# Patient Record
Sex: FEMALE | Race: BLACK OR AFRICAN AMERICAN | NOT HISPANIC OR LATINO | Employment: FULL TIME | URBAN - METROPOLITAN AREA
[De-identification: names, ages, dates, MRNs, and addresses within clinical notes are randomized per-mention and may not be internally consistent; named-entity substitution may affect disease eponyms.]

---

## 2018-06-26 ENCOUNTER — HOSPITAL ENCOUNTER (INPATIENT)
Facility: HOSPITAL | Age: 23
LOS: 5 days | Discharge: HOME | End: 2018-07-01
Attending: OBSTETRICS & GYNECOLOGY | Admitting: OBSTETRICS & GYNECOLOGY
Payer: MEDICAID

## 2018-06-26 ENCOUNTER — ANESTHESIA (OUTPATIENT)
Dept: OBSTETRICS AND GYNECOLOGY | Facility: HOSPITAL | Age: 23
End: 2018-06-26
Payer: MEDICAID

## 2018-06-26 ENCOUNTER — ANESTHESIA EVENT (OUTPATIENT)
Dept: OBSTETRICS AND GYNECOLOGY | Facility: HOSPITAL | Age: 23
End: 2018-06-26
Payer: MEDICAID

## 2018-06-26 DIAGNOSIS — Z34.80 SUPERVISION OF OTHER NORMAL PREGNANCY, ANTEPARTUM: ICD-10-CM

## 2018-06-26 DIAGNOSIS — D56.3 ALPHA THALASSEMIA TRAIT: ICD-10-CM

## 2018-06-26 DIAGNOSIS — Z34.03 ENCOUNTER FOR SUPERVISION OF NORMAL FIRST PREGNANCY IN THIRD TRIMESTER: ICD-10-CM

## 2018-06-26 LAB
ABO + RH BLD: NORMAL
ALBUMIN SERPL BCP-MCNC: 2.6 G/DL
ALP SERPL-CCNC: 182 U/L
ALT SERPL W/O P-5'-P-CCNC: 9 U/L
ANION GAP SERPL CALC-SCNC: 10 MMOL/L
AST SERPL-CCNC: 13 U/L
BASOPHILS # BLD AUTO: 0.01 K/UL
BASOPHILS NFR BLD: 0.1 %
BILIRUB SERPL-MCNC: 0.4 MG/DL
BLD GP AB SCN CELLS X3 SERPL QL: NORMAL
BUN SERPL-MCNC: 3 MG/DL
CALCIUM SERPL-MCNC: 9.2 MG/DL
CHLORIDE SERPL-SCNC: 110 MMOL/L
CO2 SERPL-SCNC: 20 MMOL/L
CREAT SERPL-MCNC: 0.7 MG/DL
CREAT UR-MCNC: 31.6 MG/DL
CREAT UR-MCNC: 47.4 MG/DL
DIFFERENTIAL METHOD: ABNORMAL
EOSINOPHIL # BLD AUTO: 0.1 K/UL
EOSINOPHIL NFR BLD: 0.7 %
ERYTHROCYTE [DISTWIDTH] IN BLOOD BY AUTOMATED COUNT: 15.8 %
EST. GFR  (AFRICAN AMERICAN): >60 ML/MIN/1.73 M^2
EST. GFR  (NON AFRICAN AMERICAN): >60 ML/MIN/1.73 M^2
GLUCOSE SERPL-MCNC: 68 MG/DL
HCT VFR BLD AUTO: 27.2 %
HGB BLD-MCNC: 8.5 G/DL
LDH SERPL L TO P-CCNC: 187 U/L
LYMPHOCYTES # BLD AUTO: 1.3 K/UL
LYMPHOCYTES NFR BLD: 17.5 %
MCH RBC QN AUTO: 22 PG
MCHC RBC AUTO-ENTMCNC: 31.3 G/DL
MCV RBC AUTO: 71 FL
MONOCYTES # BLD AUTO: 0.7 K/UL
MONOCYTES NFR BLD: 8.7 %
NEUTROPHILS # BLD AUTO: 5.6 K/UL
NEUTROPHILS NFR BLD: 72.7 %
PLATELET # BLD AUTO: 334 K/UL
PMV BLD AUTO: 10.9 FL
POTASSIUM SERPL-SCNC: 3.7 MMOL/L
PROT SERPL-MCNC: 6.5 G/DL
PROT UR-MCNC: 31 MG/DL
PROT UR-MCNC: 79 MG/DL
PROT/CREAT RATIO, UR: 0.98
PROT/CREAT RATIO, UR: 1.67
RBC # BLD AUTO: 3.86 M/UL
SODIUM SERPL-SCNC: 140 MMOL/L
URATE SERPL-MCNC: 5.5 MG/DL
WBC # BLD AUTO: 7.66 K/UL

## 2018-06-26 PROCEDURE — 84156 ASSAY OF PROTEIN URINE: CPT | Mod: 91

## 2018-06-26 PROCEDURE — 83615 LACTATE (LD) (LDH) ENZYME: CPT

## 2018-06-26 PROCEDURE — 63600175 PHARM REV CODE 636 W HCPCS: Performed by: OBSTETRICS & GYNECOLOGY

## 2018-06-26 PROCEDURE — 3E033VJ INTRODUCTION OF OTHER HORMONE INTO PERIPHERAL VEIN, PERCUTANEOUS APPROACH: ICD-10-PCS | Performed by: OBSTETRICS & GYNECOLOGY

## 2018-06-26 PROCEDURE — 86850 RBC ANTIBODY SCREEN: CPT

## 2018-06-26 PROCEDURE — 59514 CESAREAN DELIVERY ONLY: CPT | Mod: AA,,, | Performed by: ANESTHESIOLOGY

## 2018-06-26 PROCEDURE — 86592 SYPHILIS TEST NON-TREP QUAL: CPT

## 2018-06-26 PROCEDURE — 62326 NJX INTERLAMINAR LMBR/SAC: CPT | Performed by: ANESTHESIOLOGY

## 2018-06-26 PROCEDURE — 25000003 PHARM REV CODE 250: Performed by: OBSTETRICS & GYNECOLOGY

## 2018-06-26 PROCEDURE — 27200710 HC EPIDURAL INFUSION PUMP SET: Performed by: ANESTHESIOLOGY

## 2018-06-26 PROCEDURE — 25000003 PHARM REV CODE 250: Performed by: ANESTHESIOLOGY

## 2018-06-26 PROCEDURE — 84550 ASSAY OF BLOOD/URIC ACID: CPT

## 2018-06-26 PROCEDURE — 10907ZC DRAINAGE OF AMNIOTIC FLUID, THERAPEUTIC FROM PRODUCTS OF CONCEPTION, VIA NATURAL OR ARTIFICIAL OPENING: ICD-10-PCS | Performed by: OBSTETRICS & GYNECOLOGY

## 2018-06-26 PROCEDURE — 80053 COMPREHEN METABOLIC PANEL: CPT

## 2018-06-26 PROCEDURE — 82570 ASSAY OF URINE CREATININE: CPT

## 2018-06-26 PROCEDURE — 27800517 HC TRAY,EPIDURAL-CONTINUOUS: Performed by: ANESTHESIOLOGY

## 2018-06-26 PROCEDURE — 36415 COLL VENOUS BLD VENIPUNCTURE: CPT

## 2018-06-26 PROCEDURE — 11000001 HC ACUTE MED/SURG PRIVATE ROOM

## 2018-06-26 PROCEDURE — 85025 COMPLETE CBC W/AUTO DIFF WBC: CPT

## 2018-06-26 PROCEDURE — 63600175 PHARM REV CODE 636 W HCPCS: Performed by: ANESTHESIOLOGY

## 2018-06-26 RX ORDER — FENTANYL/BUPIVACAINE/NS/PF 2MCG/ML-.1
PLASTIC BAG, INJECTION (ML) INJECTION CONTINUOUS PRN
Status: DISCONTINUED | OUTPATIENT
Start: 2018-06-26 | End: 2018-06-27

## 2018-06-26 RX ORDER — SODIUM CHLORIDE, SODIUM LACTATE, POTASSIUM CHLORIDE, CALCIUM CHLORIDE 600; 310; 30; 20 MG/100ML; MG/100ML; MG/100ML; MG/100ML
INJECTION, SOLUTION INTRAVENOUS CONTINUOUS
Status: DISCONTINUED | OUTPATIENT
Start: 2018-06-26 | End: 2018-06-27

## 2018-06-26 RX ORDER — ACETAMINOPHEN 325 MG/1
650 TABLET ORAL ONCE AS NEEDED
Status: ACTIVE | OUTPATIENT
Start: 2018-06-26 | End: 2018-06-26

## 2018-06-26 RX ORDER — BUPIVACAINE HYDROCHLORIDE 2.5 MG/ML
INJECTION, SOLUTION EPIDURAL; INFILTRATION; INTRACAUDAL
Status: DISCONTINUED | OUTPATIENT
Start: 2018-06-26 | End: 2018-06-27

## 2018-06-26 RX ORDER — OXYTOCIN/RINGER'S LACTATE 20/1000 ML
2 PLASTIC BAG, INJECTION (ML) INTRAVENOUS CONTINUOUS
Status: DISCONTINUED | OUTPATIENT
Start: 2018-06-26 | End: 2018-06-27

## 2018-06-26 RX ORDER — MAGNESIUM SULFATE HEPTAHYDRATE 40 MG/ML
4 INJECTION, SOLUTION INTRAVENOUS ONCE
Status: COMPLETED | OUTPATIENT
Start: 2018-06-26 | End: 2018-06-27

## 2018-06-26 RX ORDER — HYDRALAZINE HYDROCHLORIDE 20 MG/ML
10 INJECTION INTRAMUSCULAR; INTRAVENOUS EVERY 30 MIN PRN
Status: DISCONTINUED | OUTPATIENT
Start: 2018-06-26 | End: 2018-06-27

## 2018-06-26 RX ORDER — MAGNESIUM SULFATE HEPTAHYDRATE 40 MG/ML
2 INJECTION, SOLUTION INTRAVENOUS CONTINUOUS
Status: DISCONTINUED | OUTPATIENT
Start: 2018-06-27 | End: 2018-06-27

## 2018-06-26 RX ORDER — SODIUM CHLORIDE, SODIUM LACTATE, POTASSIUM CHLORIDE, CALCIUM CHLORIDE 600; 310; 30; 20 MG/100ML; MG/100ML; MG/100ML; MG/100ML
1000 INJECTION, SOLUTION INTRAVENOUS CONTINUOUS
Status: DISCONTINUED | OUTPATIENT
Start: 2018-06-27 | End: 2018-06-27

## 2018-06-26 RX ORDER — FENTANYL CITRATE 50 UG/ML
INJECTION, SOLUTION INTRAMUSCULAR; INTRAVENOUS
Status: DISCONTINUED | OUTPATIENT
Start: 2018-06-26 | End: 2018-06-27

## 2018-06-26 RX ORDER — CALCIUM GLUCONATE 98 MG/ML
1 INJECTION, SOLUTION INTRAVENOUS
Status: DISCONTINUED | OUTPATIENT
Start: 2018-06-27 | End: 2018-06-27

## 2018-06-26 RX ADMIN — SODIUM CHLORIDE, SODIUM LACTATE, POTASSIUM CHLORIDE, AND CALCIUM CHLORIDE: .6; .31; .03; .02 INJECTION, SOLUTION INTRAVENOUS at 11:06

## 2018-06-26 RX ADMIN — Medication 2 MILLI-UNITS/MIN: at 12:06

## 2018-06-26 RX ADMIN — SODIUM CHLORIDE, SODIUM LACTATE, POTASSIUM CHLORIDE, AND CALCIUM CHLORIDE 1000 ML: .6; .31; .03; .02 INJECTION, SOLUTION INTRAVENOUS at 09:06

## 2018-06-26 RX ADMIN — BUPIVACAINE HYDROCHLORIDE 5 ML: 2.5 INJECTION, SOLUTION EPIDURAL; INFILTRATION; INTRACAUDAL; PERINEURAL at 10:06

## 2018-06-26 RX ADMIN — FENTANYL CITRATE 100 MCG: 50 INJECTION INTRAMUSCULAR; INTRAVENOUS at 10:06

## 2018-06-26 RX ADMIN — Medication 10 ML/HR: at 10:06

## 2018-06-26 RX ADMIN — SODIUM CHLORIDE, SODIUM LACTATE, POTASSIUM CHLORIDE, AND CALCIUM CHLORIDE: .6; .31; .03; .02 INJECTION, SOLUTION INTRAVENOUS at 01:06

## 2018-06-26 RX ADMIN — MAGNESIUM SULFATE HEPTAHYDRATE 4 G: 40 INJECTION, SOLUTION INTRAVENOUS at 11:06

## 2018-06-26 NOTE — PROGRESS NOTES
To room, efm adjusted, toco adjusted... Baby very active difficult to race. Fetal heart rate.... Patient verb understanding to remain in one position in order to obtain tracing. Patient still anxious.....

## 2018-06-26 NOTE — PROGRESS NOTES
To room, patient very very anxious, enc to relax, will monitor bp closely.... Lights turned off, mother at bedside.

## 2018-06-26 NOTE — PROGRESS NOTES
Spoke with MD, labs drawn, all questions answered.... Iv started, bolus in progess, pitocin started.

## 2018-06-26 NOTE — PROGRESS NOTES
Pt with bp elevation . Seems to be very uncomfortable on pitocin but does not want any meds or epidural. Will do preeclamsia bloodwork

## 2018-06-27 ENCOUNTER — SURGERY (OUTPATIENT)
Age: 23
End: 2018-06-27

## 2018-06-27 PROBLEM — Z34.80 SUPERVISION OF OTHER NORMAL PREGNANCY, ANTEPARTUM: Status: ACTIVE | Noted: 2018-06-27

## 2018-06-27 LAB
MAGNESIUM SERPL-MCNC: 5.2 MG/DL
MAGNESIUM SERPL-MCNC: 6 MG/DL
RPR SER QL: NORMAL

## 2018-06-27 PROCEDURE — 37000009 HC ANESTHESIA EA ADD 15 MINS: Performed by: OBSTETRICS & GYNECOLOGY

## 2018-06-27 PROCEDURE — 72100003 HC LABOR CARE, EA. ADDL. 8 HRS

## 2018-06-27 PROCEDURE — 72100002 HC LABOR CARE, 1ST 8 HOURS

## 2018-06-27 PROCEDURE — 25000003 PHARM REV CODE 250: Performed by: OBSTETRICS & GYNECOLOGY

## 2018-06-27 PROCEDURE — 63600175 PHARM REV CODE 636 W HCPCS: Performed by: OBSTETRICS & GYNECOLOGY

## 2018-06-27 PROCEDURE — 63600175 PHARM REV CODE 636 W HCPCS: Performed by: REGISTERED NURSE

## 2018-06-27 PROCEDURE — S0028 INJECTION, FAMOTIDINE, 20 MG: HCPCS | Performed by: REGISTERED NURSE

## 2018-06-27 PROCEDURE — 25000003 PHARM REV CODE 250: Performed by: REGISTERED NURSE

## 2018-06-27 PROCEDURE — 36415 COLL VENOUS BLD VENIPUNCTURE: CPT

## 2018-06-27 PROCEDURE — 83735 ASSAY OF MAGNESIUM: CPT | Mod: 91

## 2018-06-27 PROCEDURE — 10H07YZ INSERTION OF OTHER DEVICE INTO PRODUCTS OF CONCEPTION, VIA NATURAL OR ARTIFICIAL OPENING: ICD-10-PCS | Performed by: OBSTETRICS & GYNECOLOGY

## 2018-06-27 PROCEDURE — 11000001 HC ACUTE MED/SURG PRIVATE ROOM

## 2018-06-27 PROCEDURE — 51702 INSERT TEMP BLADDER CATH: CPT

## 2018-06-27 PROCEDURE — 27000181 HC CABLE, IUPC

## 2018-06-27 PROCEDURE — 88307 TISSUE EXAM BY PATHOLOGIST: CPT | Mod: 26,,, | Performed by: PATHOLOGY

## 2018-06-27 PROCEDURE — 88307 TISSUE EXAM BY PATHOLOGIST: CPT | Performed by: PATHOLOGY

## 2018-06-27 PROCEDURE — 25000003 PHARM REV CODE 250: Performed by: ANESTHESIOLOGY

## 2018-06-27 PROCEDURE — 37000008 HC ANESTHESIA 1ST 15 MINUTES: Performed by: OBSTETRICS & GYNECOLOGY

## 2018-06-27 PROCEDURE — 36000685 HC CESAREAN SECTION LEVEL I

## 2018-06-27 RX ORDER — HYDROMORPHONE HCL IN 0.9% NACL 6 MG/30 ML
PATIENT CONTROLLED ANALGESIA SYRINGE INTRAVENOUS CONTINUOUS
Status: DISCONTINUED | OUTPATIENT
Start: 2018-06-27 | End: 2018-06-27

## 2018-06-27 RX ORDER — NALOXONE HCL 0.4 MG/ML
0.02 VIAL (ML) INJECTION
Status: DISCONTINUED | OUTPATIENT
Start: 2018-06-27 | End: 2018-07-01 | Stop reason: HOSPADM

## 2018-06-27 RX ORDER — OXYCODONE AND ACETAMINOPHEN 10; 325 MG/1; MG/1
1 TABLET ORAL EVERY 4 HOURS PRN
Status: DISCONTINUED | OUTPATIENT
Start: 2018-06-27 | End: 2018-07-01 | Stop reason: HOSPADM

## 2018-06-27 RX ORDER — LIDOCAINE HCL/EPINEPHRINE/PF 2%-1:200K
VIAL (ML) INJECTION
Status: DISCONTINUED | OUTPATIENT
Start: 2018-06-27 | End: 2018-06-27

## 2018-06-27 RX ORDER — METOCLOPRAMIDE HYDROCHLORIDE 5 MG/ML
INJECTION INTRAMUSCULAR; INTRAVENOUS
Status: DISCONTINUED | OUTPATIENT
Start: 2018-06-27 | End: 2018-06-27

## 2018-06-27 RX ORDER — MUPIROCIN 20 MG/G
1 OINTMENT TOPICAL 2 TIMES DAILY
Status: DISCONTINUED | OUTPATIENT
Start: 2018-06-27 | End: 2018-07-01 | Stop reason: HOSPADM

## 2018-06-27 RX ORDER — LIDOCAINE HCL/PF 100 MG/5ML
SYRINGE (ML) INTRAVENOUS
Status: DISCONTINUED | OUTPATIENT
Start: 2018-06-27 | End: 2018-06-27

## 2018-06-27 RX ORDER — DOCUSATE SODIUM 100 MG/1
200 CAPSULE, LIQUID FILLED ORAL 2 TIMES DAILY
Status: DISCONTINUED | OUTPATIENT
Start: 2018-06-27 | End: 2018-07-01 | Stop reason: HOSPADM

## 2018-06-27 RX ORDER — OXYTOCIN/RINGER'S LACTATE 20/1000 ML
41.65 PLASTIC BAG, INJECTION (ML) INTRAVENOUS CONTINUOUS
Status: DISPENSED | OUTPATIENT
Start: 2018-06-27 | End: 2018-06-28

## 2018-06-27 RX ORDER — ONDANSETRON HYDROCHLORIDE 2 MG/ML
INJECTION, SOLUTION INTRAMUSCULAR; INTRAVENOUS
Status: DISCONTINUED | OUTPATIENT
Start: 2018-06-27 | End: 2018-06-27

## 2018-06-27 RX ORDER — ONDANSETRON 8 MG/1
8 TABLET, ORALLY DISINTEGRATING ORAL EVERY 8 HOURS PRN
Status: DISCONTINUED | OUTPATIENT
Start: 2018-06-27 | End: 2018-07-01 | Stop reason: HOSPADM

## 2018-06-27 RX ORDER — ADHESIVE BANDAGE
30 BANDAGE TOPICAL 2 TIMES DAILY PRN
Status: DISCONTINUED | OUTPATIENT
Start: 2018-06-28 | End: 2018-07-01 | Stop reason: HOSPADM

## 2018-06-27 RX ORDER — KETOROLAC TROMETHAMINE 30 MG/ML
30 INJECTION, SOLUTION INTRAMUSCULAR; INTRAVENOUS EVERY 6 HOURS
Status: DISPENSED | OUTPATIENT
Start: 2018-06-27 | End: 2018-06-28

## 2018-06-27 RX ORDER — FAMOTIDINE 10 MG/ML
INJECTION INTRAVENOUS
Status: DISCONTINUED | OUTPATIENT
Start: 2018-06-27 | End: 2018-06-27

## 2018-06-27 RX ORDER — DIPHENHYDRAMINE HCL 25 MG
25 CAPSULE ORAL EVERY 4 HOURS PRN
Status: DISCONTINUED | OUTPATIENT
Start: 2018-06-27 | End: 2018-07-01 | Stop reason: HOSPADM

## 2018-06-27 RX ORDER — BISACODYL 10 MG
10 SUPPOSITORY, RECTAL RECTAL ONCE AS NEEDED
Status: ACTIVE | OUTPATIENT
Start: 2018-06-27 | End: 2018-06-27

## 2018-06-27 RX ORDER — NALOXONE HCL 0.4 MG/ML
0.2 VIAL (ML) INJECTION
Status: DISCONTINUED | OUTPATIENT
Start: 2018-06-27 | End: 2018-06-27

## 2018-06-27 RX ORDER — PROPOFOL 10 MG/ML
VIAL (ML) INTRAVENOUS
Status: DISCONTINUED | OUTPATIENT
Start: 2018-06-27 | End: 2018-06-27

## 2018-06-27 RX ORDER — LABETALOL HYDROCHLORIDE 5 MG/ML
20 INJECTION, SOLUTION INTRAVENOUS ONCE AS NEEDED
Status: COMPLETED | OUTPATIENT
Start: 2018-06-27 | End: 2018-06-27

## 2018-06-27 RX ORDER — CALCIUM GLUCONATE 98 MG/ML
1 INJECTION, SOLUTION INTRAVENOUS
Status: DISCONTINUED | OUTPATIENT
Start: 2018-06-28 | End: 2018-07-01 | Stop reason: HOSPADM

## 2018-06-27 RX ORDER — MIDAZOLAM HYDROCHLORIDE 1 MG/ML
INJECTION, SOLUTION INTRAMUSCULAR; INTRAVENOUS
Status: DISCONTINUED | OUTPATIENT
Start: 2018-06-27 | End: 2018-06-27

## 2018-06-27 RX ORDER — MAGNESIUM SULFATE HEPTAHYDRATE 40 MG/ML
4 INJECTION, SOLUTION INTRAVENOUS ONCE
Status: COMPLETED | OUTPATIENT
Start: 2018-06-28 | End: 2018-06-28

## 2018-06-27 RX ORDER — SIMETHICONE 80 MG
1 TABLET,CHEWABLE ORAL EVERY 6 HOURS PRN
Status: DISCONTINUED | OUTPATIENT
Start: 2018-06-27 | End: 2018-07-01 | Stop reason: HOSPADM

## 2018-06-27 RX ORDER — HYDROCORTISONE 25 MG/G
CREAM TOPICAL 3 TIMES DAILY PRN
Status: DISCONTINUED | OUTPATIENT
Start: 2018-06-27 | End: 2018-07-01 | Stop reason: HOSPADM

## 2018-06-27 RX ORDER — KETAMINE HYDROCHLORIDE 100 MG/ML
INJECTION, SOLUTION INTRAMUSCULAR; INTRAVENOUS
Status: DISCONTINUED | OUTPATIENT
Start: 2018-06-27 | End: 2018-06-27

## 2018-06-27 RX ORDER — SODIUM CHLORIDE, SODIUM LACTATE, POTASSIUM CHLORIDE, CALCIUM CHLORIDE 600; 310; 30; 20 MG/100ML; MG/100ML; MG/100ML; MG/100ML
INJECTION, SOLUTION INTRAVENOUS CONTINUOUS
Status: ACTIVE | OUTPATIENT
Start: 2018-06-27 | End: 2018-06-28

## 2018-06-27 RX ORDER — MAGNESIUM SULFATE HEPTAHYDRATE 40 MG/ML
2 INJECTION, SOLUTION INTRAVENOUS CONTINUOUS
Status: DISCONTINUED | OUTPATIENT
Start: 2018-06-28 | End: 2018-07-01 | Stop reason: HOSPADM

## 2018-06-27 RX ORDER — IBUPROFEN 400 MG/1
800 TABLET ORAL EVERY 8 HOURS
Status: DISCONTINUED | OUTPATIENT
Start: 2018-06-28 | End: 2018-07-01 | Stop reason: HOSPADM

## 2018-06-27 RX ORDER — MORPHINE SULFATE 1 MG/ML
INJECTION INTRAVENOUS CONTINUOUS
Status: DISPENSED | OUTPATIENT
Start: 2018-06-27 | End: 2018-06-28

## 2018-06-27 RX ORDER — OXYCODONE AND ACETAMINOPHEN 5; 325 MG/1; MG/1
1 TABLET ORAL EVERY 4 HOURS PRN
Status: DISCONTINUED | OUTPATIENT
Start: 2018-06-27 | End: 2018-07-01 | Stop reason: HOSPADM

## 2018-06-27 RX ORDER — PHENYLEPHRINE HYDROCHLORIDE 10 MG/ML
INJECTION INTRAVENOUS
Status: DISCONTINUED | OUTPATIENT
Start: 2018-06-27 | End: 2018-06-27

## 2018-06-27 RX ORDER — CEFAZOLIN SODIUM 2 G/50ML
2 SOLUTION INTRAVENOUS ONCE
Status: COMPLETED | OUTPATIENT
Start: 2018-06-27 | End: 2018-06-27

## 2018-06-27 RX ORDER — OXYTOCIN 10 [USP'U]/ML
INJECTION, SOLUTION INTRAMUSCULAR; INTRAVENOUS
Status: DISCONTINUED | OUTPATIENT
Start: 2018-06-27 | End: 2018-06-27

## 2018-06-27 RX ORDER — AMOXICILLIN 250 MG
1 CAPSULE ORAL NIGHTLY PRN
Status: DISCONTINUED | OUTPATIENT
Start: 2018-06-27 | End: 2018-07-01 | Stop reason: HOSPADM

## 2018-06-27 RX ADMIN — PROPOFOL 40 MG: 10 INJECTION, EMULSION INTRAVENOUS at 04:06

## 2018-06-27 RX ADMIN — FAMOTIDINE 20 MG: 10 INJECTION, SOLUTION INTRAVENOUS at 03:06

## 2018-06-27 RX ADMIN — PROPOFOL 20 MG: 10 INJECTION, EMULSION INTRAVENOUS at 04:06

## 2018-06-27 RX ADMIN — KETOROLAC TROMETHAMINE 30 MG: 30 INJECTION, SOLUTION INTRAMUSCULAR at 08:06

## 2018-06-27 RX ADMIN — LIDOCAINE HYDROCHLORIDE,EPINEPHRINE BITARTRATE 3 ML: 20; .005 INJECTION, SOLUTION EPIDURAL; INFILTRATION; INTRACAUDAL; PERINEURAL at 04:06

## 2018-06-27 RX ADMIN — Medication 41.65 MILLI-UNITS/MIN: at 06:06

## 2018-06-27 RX ADMIN — Medication 20 MILLI-UNITS/MIN: at 02:06

## 2018-06-27 RX ADMIN — PHENYLEPHRINE HYDROCHLORIDE 100 MCG: 10 INJECTION INTRAVENOUS at 04:06

## 2018-06-27 RX ADMIN — MORPHINE SULFATE: 1 INJECTION INTRAVENOUS at 06:06

## 2018-06-27 RX ADMIN — SODIUM CHLORIDE, SODIUM LACTATE, POTASSIUM CHLORIDE, AND CALCIUM CHLORIDE: .6; .31; .03; .02 INJECTION, SOLUTION INTRAVENOUS at 02:06

## 2018-06-27 RX ADMIN — MAGNESIUM SULFATE IN WATER 2 G/HR: 40 INJECTION, SOLUTION INTRAVENOUS at 09:06

## 2018-06-27 RX ADMIN — METOCLOPRAMIDE 10 MG: 5 INJECTION, SOLUTION INTRAMUSCULAR; INTRAVENOUS at 03:06

## 2018-06-27 RX ADMIN — OXYTOCIN 30 UNITS: 10 INJECTION, SOLUTION INTRAMUSCULAR; INTRAVENOUS at 04:06

## 2018-06-27 RX ADMIN — LIDOCAINE HYDROCHLORIDE 50 MG: 20 INJECTION, SOLUTION INTRAVENOUS at 04:06

## 2018-06-27 RX ADMIN — LIDOCAINE HYDROCHLORIDE,EPINEPHRINE BITARTRATE 10 ML: 20; .005 INJECTION, SOLUTION EPIDURAL; INFILTRATION; INTRACAUDAL; PERINEURAL at 03:06

## 2018-06-27 RX ADMIN — CEFAZOLIN SODIUM 2 G: 2 SOLUTION INTRAVENOUS at 03:06

## 2018-06-27 RX ADMIN — DOCUSATE SODIUM 200 MG: 100 CAPSULE, LIQUID FILLED ORAL at 09:06

## 2018-06-27 RX ADMIN — MAGNESIUM SULFATE IN WATER 4 G: 40 INJECTION, SOLUTION INTRAVENOUS at 11:06

## 2018-06-27 RX ADMIN — PROPOFOL 10 MG: 10 INJECTION, EMULSION INTRAVENOUS at 04:06

## 2018-06-27 RX ADMIN — SIMETHICONE CHEW TAB 80 MG 80 MG: 80 TABLET ORAL at 09:06

## 2018-06-27 RX ADMIN — MIDAZOLAM HYDROCHLORIDE 1 MG: 1 INJECTION, SOLUTION INTRAMUSCULAR; INTRAVENOUS at 04:06

## 2018-06-27 RX ADMIN — LIDOCAINE HYDROCHLORIDE,EPINEPHRINE BITARTRATE 5 ML: 20; .005 INJECTION, SOLUTION EPIDURAL; INFILTRATION; INTRACAUDAL; PERINEURAL at 03:06

## 2018-06-27 RX ADMIN — MUPIROCIN 1 G: 20 OINTMENT TOPICAL at 09:06

## 2018-06-27 RX ADMIN — ONDANSETRON 4 MG: 2 INJECTION, SOLUTION INTRAMUSCULAR; INTRAVENOUS at 04:06

## 2018-06-27 RX ADMIN — KETAMINE HYDROCHLORIDE 30 MG: 100 INJECTION, SOLUTION, CONCENTRATE INTRAMUSCULAR; INTRAVENOUS at 04:06

## 2018-06-27 RX ADMIN — SODIUM CHLORIDE, SODIUM LACTATE, POTASSIUM CHLORIDE, AND CALCIUM CHLORIDE: .6; .31; .03; .02 INJECTION, SOLUTION INTRAVENOUS at 09:06

## 2018-06-27 RX ADMIN — LABETALOL HYDROCHLORIDE 20 MG: 5 INJECTION, SOLUTION INTRAVENOUS at 11:06

## 2018-06-27 RX ADMIN — MAGNESIUM SULFATE IN WATER 2 G/HR: 40 INJECTION, SOLUTION INTRAVENOUS at 12:06

## 2018-06-27 NOTE — ANESTHESIA PREPROCEDURE EVALUATION
2018  Lizy Scott is a 23 y.o., female   Pre-operative evaluation for * No surgery found *    Lizy Scott is a 23 y.o. female   OB History      Para Term  AB Living    2 0 0 0 1 0    SAB TAB Ectopic Multiple Live Births    1 0 0 0              Patient Active Problem List   Diagnosis    Alpha thalassemia trait       Review of patient's allergies indicates:  No Known Allergies    No current facility-administered medications on file prior to encounter.      Current Outpatient Prescriptions on File Prior to Encounter   Medication Sig Dispense Refill    prenatal vit 10-iron fum-folic 65-1 mg Tab Take 1 tablet by mouth once daily. 30 tablet 12       History reviewed. No pertinent surgical history.    Social History     Social History    Marital status: Single     Spouse name: N/A    Number of children: N/A    Years of education: N/A     Occupational History    Not on file.     Social History Main Topics    Smoking status: Never Smoker    Smokeless tobacco: Never Used    Alcohol use No    Drug use: Unknown    Sexual activity: Yes     Other Topics Concern    Not on file     Social History Narrative    No narrative on file         CBC:   Recent Labs      18   1147   WBC  7.66   RBC  3.86*   HGB  8.5*   HCT  27.2*   PLT  334   MCV  71*   MCH  22.0*   MCHC  31.3*       CMP:   Recent Labs      18   1853   NA  140   K  3.7   CL  110   CO2  20*   BUN  3*   CREATININE  0.7   GLU  68*   CALCIUM  9.2   ALBUMIN  2.6*   PROT  6.5   ALKPHOS  182*   ALT  9*   AST  13   BILITOT  0.4     Lab Results   Component Value Date    WBC 7.66 2018    HGB 8.5 (L) 2018    HCT 27.2 (L) 2018    MCV 71 (L) 2018     2018         Anesthesia Evaluation    I have reviewed the Patient Summary Reports.    I have reviewed the Nursing Notes.   I have reviewed the  Medications.     Review of Systems  Anesthesia Hx:  No problems with previous Anesthesia Denies Hx of Anesthetic complications  History of prior surgery of interest to airway management or planning: Denies Family Hx of Anesthesia complications.   Denies Personal Hx of Anesthesia complications.   Social:  No Alcohol Use, Non-Smoker    Hematology/Oncology:  Hematology Normal   Oncology Normal     EENT/Dental:EENT/Dental Normal   Cardiovascular:   Exercise tolerance: good Hypertension    Pulmonary:  Pulmonary Normal    Renal/:  Renal/ Normal     Hepatic/GI:  Hepatic/GI Normal    OB/GYN/PEDS:   in active labor   Neurological:  Neurology Normal    Endocrine:  Endocrine Normal    Psych:  Psychiatric Normal       Body mass index is 37.86 kg/m².      Physical Exam  General:  Well nourished, Obesity    Airway/Jaw/Neck:  Airway Findings: Mouth Opening: Normal Tongue: Normal  General Airway Assessment: Adult, Average  Mallampati: II  TM Distance: Normal, at least 6 cm  Jaw/Neck Findings:  Neck ROM: Normal ROM      Dental:  Dental Findings: In tact   Chest/Lungs:  Chest/Lungs Findings: Normal Respiratory Rate, Clear to auscultation     Heart/Vascular:  Heart Findings: Rate: Normal  Rhythm: Regular Rhythm        Mental Status:  Mental Status Findings:  Alert and Oriented, Cooperative         Anesthesia Plan  Type of Anesthesia, risks & benefits discussed:  Anesthesia Type:  epidural  Patient's Preference:   Intra-op Monitoring Plan: standard ASA monitors  Intra-op Monitoring Plan Comments:   Post Op Pain Control Plan: epidural analgesia  Post Op Pain Control Plan Comments:   Induction:    Beta Blocker:  Patient is not currently on a Beta-Blocker (No further documentation required).       Informed Consent: Patient understands risks and agrees with Anesthesia plan.  Questions answered. Anesthesia consent signed with patient.  ASA Score: 2     Day of Surgery Review of History & Physical:    H&P update referred to the  provider.         Ready For Surgery From Anesthesia Perspective.

## 2018-06-27 NOTE — PROGRESS NOTES
Pt resting with epidural.  Nurse reports pt has been on max pit since yesterday.    Vitals:    06/27/18 1204 06/27/18 1205 06/27/18 1210 06/27/18 1215   BP:  (!) 148/84     Pulse: 96 96 98 100   Resp:       Temp:       TempSrc:       SpO2:  99% 99% 100%   Weight:       Height:         EFM 130s decreased variability  [place] q 5 min  SVE no change (2.5/80/-2)  A/P IUP @ 39 weeks 2 days, IOL, labile BP at term, failed induction  Proceed to OR for primary C/S  Will d/c Mag since no severe range BPs  NICU and Anesthesia aware

## 2018-06-27 NOTE — LACTATION NOTE
"This note was copied from a baby's chart.     06/27/18 9560   Maternal Infant Assessment   Breast Density Bilateral:;soft   Areola Bilateral:;elastic   Nipple(s) Bilateral:;everted   Infant Assessment   Sucking Reflex present   Rooting Reflex present   Swallow Reflex present   LATCH Score   Latch 2-->grasps breast, tongue down, lips flanged, rhythmic sucking   Audible Swallowing 2-->spontaneous and intermittent (24 hrs old)   Type Of Nipple 2-->everted (after stimulation)   Comfort (Breast/Nipple) 2-->soft/nontender   Hold (Positioning) 0-->full assist (staff holds infant at breast)   Score (less than 7 for 2/more consecutive times, consult Lactation Consultant) 8   Maternal Infant Feeding   Maternal Emotional State relaxed;assist needed   Infant Positioning cradle   Signs of Milk Transfer audible swallow;infant jaw motion present   Time Spent (min) 15-30 min   Latch Assistance yes   Breastfeeding History   Breastfeeding History no   Infant First Feeding   Breastfeeding Left Side (min) 20 Min   Feeding Infant   Feeding Tolerance/Success adequate pause for breath   Effective Latch During Feeding yes   Audible Swallow yes   Suck/Swallow Coordination present   Lactation Referrals   Lactation Consult Initial assessment;Knowledge deficit   Lactation Interventions   Attachment Promotion breastfeeding assistance provided   Breastfeeding Assistance assisted with positioning;infant latch-on verified;infant suck/swallow verified   Maternal Breastfeeding Support encouragement offered;lactation counseling provided   Latch Promotion positioning assisted;infant moved to breast     Max assist with position and latch to left breast in cradle hold; audible swallows noted.  Mother very sedated after C/S; full support of baby needed during breastfeeding.   Basic breastfeeding instructions given and Mother's Breastfeeding Guide reviewed, with family present.  Encouraged to call for assist prn.  States "understand" and verbalized " appropriate recall.

## 2018-06-27 NOTE — ANESTHESIA PROCEDURE NOTES
Epidural    Patient location during procedure: OB   Reason for block: primary anesthetic   Diagnosis: Active Labor   Start time: 6/26/2018 10:11 PM  Timeout: 6/26/2018 10:08 PM  End time: 6/26/2018 10:16 PM  Surgery related to: Vaginal Delivery  Staffing  Anesthesiologist: DWAYNE WILSON  Performed: anesthesiologist   Preanesthetic Checklist  Completed: patient identified, site marked, surgical consent, pre-op evaluation, timeout performed, IV checked, risks and benefits discussed, monitors and equipment checked, anesthesia consent given, hand hygiene performed and patient being monitored  Preparation  Patient position: sitting  Prep: ChloraPrep  Patient monitoring: Pulse Ox and Blood Pressure  Epidural  Skin Anesthetic: lidocaine 1%  Skin Wheal: 3 mL  Administration type: continuous  Approach: midline  Interspace: L3-4  Injection technique: CHARITY saline  Needle and Epidural Catheter  Needle type: Tuohy   Needle gauge: 17  Needle length: 3.5 inches  Needle insertion depth: 6 cm  Catheter type: springwound and multi-orifice  Catheter size: 19 G  Catheter at skin depth: 10 cm  Test dose: 3 mL of lidocaine 1.5% with Epi 1-to-200,000  Additional Documentation: incremental injection, negative aspiration for heme and CSF, no paresthesia on injection, no signs/symptoms of IV or SA injection, no significant pain on injection and no significant complaints from patient  Needle localization: anatomical landmarks  Medications:  Bolus administered: 10 mL of 0.125% bupivacaine  Volume per aspiration: 5 mL  Time between aspirations: 5 minutes  Assessment  Ease of block: easy  Patient's tolerance of the procedure: comfortable throughout block and no complaints

## 2018-06-27 NOTE — NURSING
Provider and RN at bedside. EFM reviewed. Decreased variability despite nursing interventions: pt repositioned, IVF bolus infusing, o2 on at 10l/min per face mask. Cervical exam performed - unchanged. Decision made to proceed with c/s. Mag and pitocin infusions discontinued per MD order.

## 2018-06-27 NOTE — TRANSFER OF CARE
"Anesthesia Transfer of Care Note    Patient: Lizy Scott    Procedure(s) Performed: Procedure(s) (LRB):   SECTION (N/A)    Patient location: Labor and Delivery    Anesthesia Type: epidural and MAC    Transport from OR: Transported from OR on room air with adequate spontaneous ventilation    Post pain: adequate analgesia    Post assessment: no apparent anesthetic complications and tolerated procedure well    Post vital signs: stable    Level of consciousness: responds to stimulation    Nausea/Vomiting: no nausea/vomiting    Complications: none    Transfer of care protocol was followed      Last vitals:   Visit Vitals  BP (!) 141/74   Pulse 92   Temp 36.4 °C (97.5 °F) (Axillary)   Resp 20   Ht 5' 2" (1.575 m)   Wt 93.9 kg (207 lb)   LMP 2017   SpO2 (!) 94%   Breastfeeding? Unknown   BMI 37.86 kg/m²     "

## 2018-06-27 NOTE — PROGRESS NOTES
Pt's bp steadily climbing and now in severe range . P/c ratio elevated as well . Will begin magnesium. Continue pitocin. Encourage patient to get epidural

## 2018-06-27 NOTE — PROGRESS NOTES
06/27/18 1228   [facility] Note - Strip   Strip Reviewed by [facility] Nurse? Yes   [facility] Note - Communication   [place] Nurse Communicated with Bedside Nurse Regarding: Fetal Status   [facility] Note - Notification   Nurse Notified? Yes   Name of Nurse Kierra   Doctor Notified? No

## 2018-06-27 NOTE — NURSING
185 - Report received of  at 39 weeks 1 days admitted for IOL due to possible srom and now elevated b/p from EFE Fournier. Care assumed. Full assessment done, history and medications reviewed with pt. Pt and family updated on plan of care with questions answered. Bed in low locked position, call bell in reach.     - Dr. Waller at bedside, updated on BPs, SVE /th/high. Repeat p/c ratio with cath UA and will decide to start mag once results.     - Pt ready for epidural, IVF bolus initiated.     - Anesthesia notified for pt request of epidural.      - Anesthesia at bedside for epidural,  - Time out performed,   - Test dose negative.     - Dr. Waller updated on b/p and new p/c ratio. Orders to start mag 4 g loading and 2 g/hr. Hydralazine prn 160/110.    0558 - Call placed to anesthesia regarding pt c/o pain.    0605 - Anesthesia at bedside for redose.    0615 - Report given to Dr. Cates.     0650 - Report given to oncoming shift

## 2018-06-27 NOTE — L&D DELIVERY NOTE
Section Procedure Note    Indications: failed induction    Pre-operative Diagnosis: 39 week 2 day pregnancy.    Post-operative Diagnosis: same    Surgeon: Lorna Ward     Assistants: Consuelo Kyle    Anesthesia: Epidural anesthesia    ASA Class: 1    Procedure Details   The patient was seen in the Holding Room. The risks, benefits, complications, treatment options, and expected outcomes were discussed with the patient.  The patient concurred with the proposed plan, giving informed consent.  The site of surgery properly noted/marked. The patient was taken to Operating Room # 1, identified as Lizy Scott and the procedure verified as  Delivery. A Time Out was held and the above information confirmed.    After induction of anesthesia, the patient was draped and prepped in the usual sterile manner. A Pfannenstiel incision was made and carried down through the subcutaneous tissue to the fascia. Fascial incision was made and extended transversely. The fascia was  from the underlying rectus tissue superiorly and inferiorly. The peritoneum was identified and entered. Peritoneal incision was extended longitudinally. The utero-vesical peritoneal reflection was incised transversely and the bladder flap was bluntly freed from the lower uterine segment. A low transverse uterine incision was made. Delivered from vertex presentation was a female with Apgar scores of 9 at one minute and 9 at five minutes. After the umbilical cord was clamped and cut cord blood was obtained for evaluation. The placenta was removed intact and appeared normal. The uterine outline, tubes and ovaries appeared normal. The uterine incision was closed with running locked sutures of #1 chromic. Hemostasis was observed. Lavage was carried out until clear. The peritoneum was then reapproximated with 2-0 vicryl. The rectus muscles were reapproximated with #1 chromic.  The fascia was then reapproximated with running  sutures of 0 PDS. The subcutaneous tissue was reapproximated with 3-0 plain gut.  The skin was reapproximated with staples.    Instrument, sponge, and needle counts were correct prior the abdominal closure and at the conclusion of the case.     Findings:  Viable female infant with moderate meconium in cephalic presentation, wt pending, Apgars 9,9, normal uterus, tubes and ovaries    Estimated Blood Loss:  500 cc           UOP: 150 cc           Total IV Fluids: 700 ml           Specimens: placenta           Complications:  mod meconium           Disposition: PACU - hemodynamically stable.           Condition: stable    Attending Attestation: I was present and scrubbed for the entire procedure.             Delivery Information for  Jay Scott    Birth information:  YOB: 2018   Time of birth: 4:07 PM   Sex: female   Head Delivery Date/Time:     Delivery type:    Gestational Age: 39w2d               Assessment    No data filed                          Interventions/Resuscitation         Cord    No data filed              Labor Events:       labor: No     Labor Onset Date/Time: 2018 02:00     Dilation Complete Date/Time:         Start Pushing Date/Time:       Rupture Date/Time:              Rupture type:           Fluid Amount:        Fluid Color:        Fluid Odor:        Membrane Status (PeriCalm): ARM (Artificial Rupture)      Rupture Date/Time (PeriCalm): 2018 09:15:00      Fluid Amount (PeriCalm): Moderate      Fluid Color (PeriCalm): Cloudy       steroids: None     Antibiotics given for GBS: No     Induction: none     Indications for induction:  Severe Preeclampsia     Augmentation: oxytocin     Indications for augmentation: Preeclampsia;Ineffective Contraction Pattern     Labor complications:       Additional complications:          Cervical ripening:                     Delivery:      Episiotomy:       Indication for Episiotomy:       Perineal Lacerations:    Repaired:      Periurethral Laceration:   Repaired:     Labial Laceration:   Repaired:     Sulcus Laceration:   Repaired:     Vaginal Laceration:   Repaired:     Cervical Laceration:   Repaired:     Repair suture:       Repair # of packets:       Vaginal delivery QBL (mL):        QBL (mL): 494     Combined Blood Loss (mL): 494     Vaginal Sweep Performed:       Surgicount Correct:         Other providers:            Details (if applicable):  Trial of Labor      Categorization:      Priority:     Indications for :     Incision Type:       Additional  information:  Forceps:    Vacuum:    Breech:    Observed anomalies    Other (Comments):

## 2018-06-27 NOTE — NURSING
Dr. Cates aware of change in fetal variability. Pt position readjusted, IVF bolus administered, O2 applied but still with minimal variability. Dr. Cates also aware of difficulty tracing contractions. RN and MD at bedside. IUPC placed and cervical exam performed. Will leave pitocin at current rate, variability improved with position chance, IVF, and O2. Continuing to monitor.

## 2018-06-27 NOTE — PROGRESS NOTES
Pt c/o breakthrough pain through the epidural.  Nurse reports no LOF.    Vitals:    06/27/18 1017 06/27/18 1019 06/27/18 1025 06/27/18 1030   BP:  119/76     Pulse: 95 102 99 99   Resp:       Temp:       TempSrc:       SpO2: (!) 89%  98% 98%   Weight:       Height:         Last /97, will repeat.  BPs labile  EFM   Jette q 3 min  SVE 2/80/-2 BBOW.  AROM clear.  A/P IUP @ 39 weeks 2 days, concern for SROM, pt was not ruptured.  Labile BP at term.  Continue pitocin induction.  Repeat BPs.

## 2018-06-28 LAB
BASOPHILS # BLD AUTO: 0 K/UL
BASOPHILS NFR BLD: 0 %
DIFFERENTIAL METHOD: ABNORMAL
EOSINOPHIL # BLD AUTO: 0 K/UL
EOSINOPHIL NFR BLD: 0.2 %
ERYTHROCYTE [DISTWIDTH] IN BLOOD BY AUTOMATED COUNT: 15.9 %
HCT VFR BLD AUTO: 24.2 %
HGB BLD-MCNC: 7.8 G/DL
LYMPHOCYTES # BLD AUTO: 0.8 K/UL
LYMPHOCYTES NFR BLD: 9 %
MAGNESIUM SERPL-MCNC: 5.6 MG/DL
MAGNESIUM SERPL-MCNC: 6 MG/DL
MAGNESIUM SERPL-MCNC: 6 MG/DL
MCH RBC QN AUTO: 22.2 PG
MCHC RBC AUTO-ENTMCNC: 32.2 G/DL
MCV RBC AUTO: 69 FL
MONOCYTES # BLD AUTO: 0.6 K/UL
MONOCYTES NFR BLD: 6.1 %
NEUTROPHILS # BLD AUTO: 7.9 K/UL
NEUTROPHILS NFR BLD: 84.6 %
PLATELET # BLD AUTO: 294 K/UL
PMV BLD AUTO: 11 FL
RBC # BLD AUTO: 3.52 M/UL
WBC # BLD AUTO: 9.34 K/UL

## 2018-06-28 PROCEDURE — 63600175 PHARM REV CODE 636 W HCPCS: Performed by: OBSTETRICS & GYNECOLOGY

## 2018-06-28 PROCEDURE — 36415 COLL VENOUS BLD VENIPUNCTURE: CPT

## 2018-06-28 PROCEDURE — 11000001 HC ACUTE MED/SURG PRIVATE ROOM

## 2018-06-28 PROCEDURE — 25000003 PHARM REV CODE 250: Performed by: OBSTETRICS & GYNECOLOGY

## 2018-06-28 PROCEDURE — 83735 ASSAY OF MAGNESIUM: CPT | Mod: 91

## 2018-06-28 PROCEDURE — 85025 COMPLETE CBC W/AUTO DIFF WBC: CPT

## 2018-06-28 RX ORDER — LABETALOL HYDROCHLORIDE 5 MG/ML
40 INJECTION, SOLUTION INTRAVENOUS ONCE
Status: COMPLETED | OUTPATIENT
Start: 2018-06-28 | End: 2018-06-28

## 2018-06-28 RX ADMIN — KETOROLAC TROMETHAMINE 30 MG: 30 INJECTION, SOLUTION INTRAMUSCULAR at 01:06

## 2018-06-28 RX ADMIN — DOCUSATE SODIUM 200 MG: 100 CAPSULE, LIQUID FILLED ORAL at 11:06

## 2018-06-28 RX ADMIN — MUPIROCIN 1 G: 20 OINTMENT TOPICAL at 11:06

## 2018-06-28 RX ADMIN — MAGNESIUM SULFATE IN WATER 2 G/HR: 40 INJECTION, SOLUTION INTRAVENOUS at 09:06

## 2018-06-28 RX ADMIN — MAGNESIUM SULFATE IN WATER 2 G/HR: 40 INJECTION, SOLUTION INTRAVENOUS at 12:06

## 2018-06-28 RX ADMIN — IBUPROFEN 800 MG: 400 TABLET ORAL at 11:06

## 2018-06-28 RX ADMIN — LABETALOL HYDROCHLORIDE 20 MG: 5 INJECTION, SOLUTION INTRAVENOUS at 06:06

## 2018-06-28 RX ADMIN — ONDANSETRON 8 MG: 8 TABLET, ORALLY DISINTEGRATING ORAL at 05:06

## 2018-06-28 RX ADMIN — MAGNESIUM SULFATE IN WATER 2 G/HR: 40 INJECTION, SOLUTION INTRAVENOUS at 08:06

## 2018-06-28 NOTE — PROGRESS NOTES
Subjective:     No complaints.  Not ambulating yet, voiding with irwin, tolerating PO.  No flatus. No major vaginal bleeding. Pain is controlled. Denies H/A, change in vision, RUQ/epigastric pain, N/V, CP, or SOB.    Review of Systems  Nine system ROS negative    Objective:   Vitals:  Temp: 98.6 °F (37 °C) (06/28/18 0027)  Pulse: 81 (06/28/18 0704)  Resp: 18 (06/27/18 2357)  BP: 138/84 (06/28/18 0704)  SpO2: 97 % (06/28/18 0704)  Temp:  [97.5 °F (36.4 °C)-98.6 °F (37 °C)]   Pulse:  []   Resp:  [16-20]   BP: (124-184)/()   SpO2:  [87 %-100 %]      General: no acute distress, alert and oriented to person, place and time  Breasts: nontender, nonengorged  Abdomen: non-distended, appropriately tender to palpation, uterus firm and below the umbilicus, no rebound/guarding  Incision: dressing in place, clean/dry  Pelvic: deferred, reported minimal uterine bleeding  Extremities: calves non-tender to palpation, no calf edema, erythema or palpable cords      Recent Labs  Lab 06/28/18  0648   WBC 9.34   HGB 7.8*   HCT 24.2*        Assessment:     Postoperative Day #1 s/p 1LTCS with failure to dilate, and severe preE s/p magnesium sulfate.  Hemodynamically stable doing well.    Plan:     BPs stable now on magnesium sulfate.  Will monitor after discontinuation and if severe will start a PO regimen.  Anemia - chronic with acute blood loss - asymptomatic - will continue to monitor once ambulating - will give IV iron while in-house  Advance diet, ambulate, change to po pain meds, HLIV, d/c irwin 24 hrs from delivery

## 2018-06-28 NOTE — PROGRESS NOTES
Patient eating lunch, does not want bath yet, PCA turned off and removed from room, Mag continues to infuse without problems...Patient voices no complaints

## 2018-06-28 NOTE — NURSING TRANSFER
Nursing Transfer Note      6/27/2018     Transfer To: Mother/Baby room 237    Transfer via stretcher    Transfer with belongings carried by the pt's mother, baby in open crib    Transported by AMARILYS Fernandez RNC and REGINA Bishop CST    Medicines sent: LR with Pitocin at 125, Morphine PCA pump    Chart send with patient: Yes    Notified: spouse      Upon arrival to floor: patient oriented to room, call bell in reach and bed in lowest position, MANUEL Antonio RN in room to receive pt.

## 2018-06-28 NOTE — ANESTHESIA POSTPROCEDURE EVALUATION
"Anesthesia Post Evaluation    Patient: Lizy Scott    Procedure(s) Performed: Procedure(s) (LRB):   SECTION (N/A)    Final Anesthesia Type: epidural  Patient location during evaluation: labor & delivery  Patient participation: Yes- Able to Participate  Level of consciousness: awake and alert and oriented  Post-procedure vital signs: reviewed and stable  Pain management: adequate  Airway patency: patent  PONV status at discharge: No PONV  Anesthetic complications: no      Cardiovascular status: blood pressure returned to baseline, hemodynamically stable and hypertensive  Respiratory status: unassisted, spontaneous ventilation and room air  Hydration status: euvolemic  Follow-up not needed.      Patient has pre-eclampsia    Visit Vitals  BP (!) 170/101   Pulse 90   Temp 37 °C (98.6 °F) (Oral)   Resp 18   Ht 5' 2" (1.575 m)   Wt 93.9 kg (207 lb)   LMP 2017   SpO2 99%   Breastfeeding? Unknown   BMI 37.86 kg/m²       Pain/Dejon Score: Pain Assessment Performed: Yes (2018 10:40 PM)  Presence of Pain: denies (2018 10:40 PM)  Pain Rating Prior to Med Admin: 0 (2018  1:30 AM)  Pain Rating Post Med Admin: 4 (2018  8:40 PM)      "

## 2018-06-28 NOTE — LACTATION NOTE
This note was copied from a baby's chart.  Reviewed the risks of supplementation.  Discussed the adequacy of colostrum.  Instructed on normal  feeding and sleeping patterns.  Encouraged mother to feed the infant on cue, a minimum of 8 times in 24 hours prior to supplementation to promote appropriate breast stimulation for adequate milk supply.  Discussed preferred alternative feeding methods, such as supplementing the infant via breast with SNS, syringe feeding, cup feeding, spoon feeding and finger feeding.  Discussed risks and encouraged to avoid artificial bottles and nipples.  Chooses to supplement via syringe.  Safely taught how to feed infant via chosen method.  Demonstrated by nurse and pt return demonstrates proper and safe usage.  States understand and provided appropriate recall of all information.    Discussed the desired feeding choice with the patient.  Reviewed the benefits of breastfeeding and the risks of formula feeding. States understanding of all information and verbalized appropriate recall.    Formula Feeding Discharge Instructions    Baby is to be fed by the Baby Led bottle feeding method:   Feed on Cue:  o Hunger cues - hands to mouth, bending arms and legs toward the body, sucking noises, puckered lips and rooting/searching for the nipple   Method of feeding the baby:  o always hold the baby upright, never prop a bottle  o brush the nipple across babys upper lip and wait to open  o hold bottle in a flat position, only partly full  o allow baby to pause and take breaks; burp as needed  o feeding lasts about 15 - 20 minutes  o Stop feeding with signs of fullness  o Fullness cues - sucking slows or stops, relaxed hands and arms, pushes away, falls asleep  Preparing Powdered Formula:   Remove plastic lid and wash lid with soap and water, dry and label with date   Clean top of can & open.  Remove scoop.   Follow s instructions on quantity of water and powder   Follow  pediatricians recommendation on the type of water to use   Shake well prior to feeding   For pre-mixed formula - Refrigerate and use within 24 hours.  Re-warm individual bottles immediately prior to use.   Formula expires 1 hour after in initiation of the feeding  Preparing Liquid Concentrate Formula:   Follow pediatricians recommendation on the type of water to use   Add equal amounts of liquid concentrate and formula to the bottle   Shake well prior to feeding   For pre-mixed formula - Refrigerate and use within 24 hours.  Re-warm individual bottles immediately prior to use   For formula remaining in the can, cover and refrigerate until needed.  Use within 48 hours   Formula expires 1 hour after in initiation of the feeding    Preparing Ready to Feed Formula:   Shake container well prior to opening   Pour enough formula for 1 feeding into a clean bottle   Do not add water or any other liquid   Attach nipple and cap   Shake well prior to feeding   Feed immediately   For pre-mixed formula - Refrigerate and use within 24 hours.  Re-warm individual bottles immediately prior to use   For formula remaining in the can, cover and refrigerate until needed.  Use within 48 hours   Formula expires 1 hour after in initiation of the feeding  Cleaning and sterilization of equipment for formula preparation:   Clean and disinfect working surface   Wash hands, arms and under fingernails with soap and water; dry using a clean cloth   Use bottle/nipple brush to wash all bottles, nipples, rings, caps and preparation utensils in hot soapy water before initial use and rinse   Sterilize all parts/utensils in boiling water or with a sterilization device prior to use   Continue to wash all parts with warm soapy water and rinse after each use and sterilize daily  Appropriate storage of formula if more than 1 bottle is prepared:   Put a clean nipple right side up on the bottle and cover with a nipple cap   Label  each bottle with the date and time prepared   Refrigerate until feeding time   Warm immediately prior to use by a bottle warmer or by running under warm water   Do NOT microwave bottles   For formula remaining in the can, cover and refrigerate until needed.  Use within 48 hours   Formula expires 1 hour after in initiation of the feeding  Safe formula feeding, preparation and transporting of pre-mixed feedings:   Always use thoroughly cleaned and sterilized BPA free bottles   Formula & water preference to be determined by the advice of the pediatrician   Use proper hand washing   Follow all s guidelines for preparing formula   Check all expiration dates   Clean all can tops with soap and water prior to opening; also use a clean can opener   All mixed formula should be refrigerated until immediately prior to transport   Transport in a cool insulated bag with ice packs and use within 2 hours or re-refrigerate at arrival destination   Re-warm feeding at the destination for no longer than 15 minutes      Community Resources     Women, Infants, and Children Nutrition Program   Provides free breastfeeding education, counseling, food coupons, and breast pumps for eligible women. Breastfeeding counseling is provided by peer counselors and mother-to-mother support.      [phone]   [employer].gov    [facility] Connects moms, babies, and families in [place] to free help, pregnancy resources, and information about healthy behaviors pre- and . Available .  2-226-709-BABY   www.3572189nkrd.org   info@1064715loof.org    TBEARS ([facility] Early Relationships Support & Services)   This program is for parents who have concerns about their baby's fussiness during the first year of life. Infant specialists work with you to find more ways to soothe, care for, and enjoy your baby.  [phone]   www.tbears.org   [email or web address]    [facility]  [facility] Provides preconception, pregnancy, and post discharge support through nutrition services, primary medical care for children, and many other services. Available on the phone and one-to-one.  [phone]   www.dcsno.org    AAPCC (Poison Control)   The American Association of Poison Control Centers supports the [place] [address] poison centers in their efforts to prevent and treat poison exposures. Poison centers offer free, confidential, expert medical advice 24 hours a day, seven days a week.  1-339.304.5766   www.aapcc.org/

## 2018-06-28 NOTE — H&P
History and Physical - Obstetrics    Subjective:     Patient is a 22 yo  @ 39w1d gestational age with an Estimated Date of Delivery: 18, who presents with the complaint of: leakage of fluid. Fetal Movement: normal.    Her current obstetrical history is significant for anemia in pregnancy.    Review of Systems  Nine system ROS negative except for HPI    PTA Medications   Medication Sig    ferrous sulfate 325 (65 FE) MG EC tablet Take 1 tablet (325 mg total) by mouth 2 (two) times daily.    prenatal vit 10-iron fum-folic 65-1 mg Tab Take 1 tablet by mouth once daily.       Review of patient's allergies indicates:  No Known Allergies     Past Medical History:   Diagnosis Date    Alpha thalassemia trait        Past Surgical History:   Procedure Laterality Date     SECTION N/A 2018    Procedure:  SECTION;  Surgeon: Lorna Cates MD;  Location: [facility] L[facility];  Service: OB/GYN;  Laterality: N/A;       OB History      Para Term  AB Living    2 1 1 0 1 1    SAB TAB Ectopic Multiple Live Births    1 0 0 0 1          Social History     Social History    Marital status: Single     Spouse name: N/A    Number of children: N/A    Years of education: N/A     Occupational History    Not on file.     Social History Main Topics    Smoking status: Never Smoker    Smokeless tobacco: Never Used    Alcohol use No    Drug use: Unknown    Sexual activity: Yes     Other Topics Concern    Not on file     Social History Narrative    No narrative on file       Family History   Problem Relation Age of Onset    Hypertension Mother        Objective:   Vital Signs (Most Recent)  Temp: 98.6 °F (37 °C) (18 0027)  Pulse: 81 (18 0704)  Resp: 18 (18 2357)  BP: 138/84 (18 0704)  SpO2: 97 % (18 0704)  Fetal Heart Tones: reviewed    General: no acute distress, alert and oriented to person, place and time  Abdomen: gravid, no palpable  contractions  Incision(s): none  Extremities: calves non-tender to palpation, no calf edema, erythema or palpable cords  Cervix: /3, nitrazine    Laboratory: see results console    Assessment:      Conditions: anemia    22 yo  @ 39w1d gestational age with most likely PROM    Presenting part: vertex, confirmed by exam.  GBS negative.    Plan:   Patient previously consented in the office.  Consents reviewed today.  All questions answered.    Admit for observation.

## 2018-06-28 NOTE — PROGRESS NOTES
Notified Darryn of patient's BP reading over the past 2 hours, rating pain level of 0 with BP level while sleeping. States to place patient on magnesium 4g loading 2g per hour and 20mg labetolol IV push. Notified charge nurse.

## 2018-06-29 LAB
MAGNESIUM SERPL-MCNC: 3.4 MG/DL
MAGNESIUM SERPL-MCNC: 4 MG/DL
MAGNESIUM SERPL-MCNC: 4.3 MG/DL
MAGNESIUM SERPL-MCNC: 6.3 MG/DL

## 2018-06-29 PROCEDURE — 25000003 PHARM REV CODE 250: Performed by: OBSTETRICS & GYNECOLOGY

## 2018-06-29 PROCEDURE — 63600175 PHARM REV CODE 636 W HCPCS: Performed by: OBSTETRICS & GYNECOLOGY

## 2018-06-29 PROCEDURE — 36415 COLL VENOUS BLD VENIPUNCTURE: CPT

## 2018-06-29 PROCEDURE — 11000001 HC ACUTE MED/SURG PRIVATE ROOM

## 2018-06-29 PROCEDURE — 83735 ASSAY OF MAGNESIUM: CPT | Mod: 91

## 2018-06-29 PROCEDURE — 83735 ASSAY OF MAGNESIUM: CPT

## 2018-06-29 RX ORDER — NIFEDIPINE 30 MG/1
30 TABLET, EXTENDED RELEASE ORAL DAILY
Status: DISCONTINUED | OUTPATIENT
Start: 2018-06-29 | End: 2018-06-30

## 2018-06-29 RX ADMIN — DOCUSATE SODIUM 200 MG: 100 CAPSULE, LIQUID FILLED ORAL at 08:06

## 2018-06-29 RX ADMIN — IBUPROFEN 800 MG: 400 TABLET ORAL at 09:06

## 2018-06-29 RX ADMIN — NIFEDIPINE 30 MG: 30 TABLET, FILM COATED, EXTENDED RELEASE ORAL at 08:06

## 2018-06-29 RX ADMIN — MUPIROCIN 1 G: 20 OINTMENT TOPICAL at 08:06

## 2018-06-29 RX ADMIN — IBUPROFEN 800 MG: 400 TABLET ORAL at 06:06

## 2018-06-29 RX ADMIN — MUPIROCIN 1 G: 20 OINTMENT TOPICAL at 09:06

## 2018-06-29 RX ADMIN — DOCUSATE SODIUM 200 MG: 100 CAPSULE, LIQUID FILLED ORAL at 09:06

## 2018-06-29 RX ADMIN — SODIUM CHLORIDE 125 MG: 9 INJECTION, SOLUTION INTRAVENOUS at 08:06

## 2018-06-29 NOTE — LACTATION NOTE
This note was copied from a baby's chart.  During report, night shift indicated that mom had opted to formula feed . Spoke with mom on first introduction to ask her if she wanted to breast feed or formula feed . Mom indicated that she tried but nothing was coming for the baby and that her nipples were flat. Grandmother repeatedly said the baby was get frustrated and that the mother's nipples were flat. I examined the mother's nipples and saw that they were everted not flat. I showed her how to hand express, but no colostrum was observed coming from either nipple. I advised her that I would ask lactation to assist. Mother has bottles of formula at the bedside with standard flow nipples. [place]'s last feeding was @ 2:45a.m. Formula was opted to be given during this feeding, but mom said she is willing to try.

## 2018-06-29 NOTE — LACTATION NOTE
"Reports formula feeding only, no longer breastfeeding.  Non-nursing engorgement precautions given.  States "understand" and verbalized appropriate recall.  "

## 2018-06-29 NOTE — PROGRESS NOTES
Delivery Progress Note  Obstetrics        SUBJECTIVE:     Postpartum Day 2:  Delivery    Ms. Scott states she feels well. She denies emotional concerns. Her pain is well controlled with current medications. The baby is well. The baby is feeding via formula. The patient is ambulating well. Ms. Scott is tolerating a normal diet. Flatus has been passed. Urinary output is adequate.    OBJECTIVE:     Vital Signs (Most Recent):  Temp: 98.4 °F (36.9 °C) (18 07)  Pulse: 80 (18)  Resp: 19 (18)  BP: (!) 159/94 (18 07)  SpO2: (!) 94 % (18 0048)    Vital Signs Range (Last 24H):  Temp:  [98.2 °F (36.8 °C)-98.6 °F (37 °C)]   Pulse:  []   Resp:  [18-20]   BP: (135-160)/(67-94)   SpO2:  [91 %-95 %]     I & O (Last 24H):  Intake/Output Summary (Last 24 hours) at 18 1004  Last data filed at 18 0600   Gross per 24 hour   Intake              240 ml   Output             5950 ml   Net            -5710 ml     Physical Exam:  General:    no distress   Lungs:  clear to auscultation bilaterally   Heart:  regular rate and rhythm, S1, S2 normal, no murmur, click, rub or gallop   Breasts:  no discharge, erythema, or tenderness   Abdomen:  soft, non-tender; bowel sounds normal   Fundus:  firm   Incision:  healing well   Lochia:   scant rubra   DVT Evaluation:  No evidence of DVT on either side seen on physical exam.     Hemoglobin/Hematocrit    Recent Labs  Lab 18  0648   HGB 7.8*   HCT 24.2*     ABO/Rh  Lab Results   Component Value Date    GROUPTRH O POS 2018     Rubella  No results for input(s): RUBELLAIGGSC in the last 168 hours.    ASSESSMENT/PLAN:     Status post  section. Doing well postoperatively.     Continue current care.    Guru Reed MD

## 2018-06-29 NOTE — NURSING
Report received from YANNI Wells RN; care assumed. AAOx3, assessment completed. C/o incisional pain, rates pain 4/10 using pain scale. Pt states she was just medicated with motrin. Left hand #18g IV patent upon flush, SL. Low transverse incision intact, ss present. No drainage. POC reviewed with pt, pt verb understanding. Call bell within reach, will monitor.

## 2018-06-30 PROCEDURE — 25000003 PHARM REV CODE 250: Performed by: OBSTETRICS & GYNECOLOGY

## 2018-06-30 PROCEDURE — 11000001 HC ACUTE MED/SURG PRIVATE ROOM

## 2018-06-30 RX ORDER — NIFEDIPINE 30 MG/1
60 TABLET, EXTENDED RELEASE ORAL DAILY
Status: DISCONTINUED | OUTPATIENT
Start: 2018-06-30 | End: 2018-07-01 | Stop reason: HOSPADM

## 2018-06-30 RX ORDER — FERROUS GLUCONATE 324(38)MG
324 TABLET ORAL
Status: DISCONTINUED | OUTPATIENT
Start: 2018-06-30 | End: 2018-06-30

## 2018-06-30 RX ORDER — FERROUS GLUCONATE 324(38)MG
324 TABLET ORAL
Status: DISCONTINUED | OUTPATIENT
Start: 2018-07-01 | End: 2018-07-01 | Stop reason: HOSPADM

## 2018-06-30 RX ADMIN — MUPIROCIN 1 G: 20 OINTMENT TOPICAL at 11:06

## 2018-06-30 RX ADMIN — DOCUSATE SODIUM 200 MG: 100 CAPSULE, LIQUID FILLED ORAL at 09:06

## 2018-06-30 RX ADMIN — FERROUS GLUCONATE TAB 324 MG (37.5 MG ELEMENTAL IRON) 324 MG: 324 (37.5 FE) TAB at 11:06

## 2018-06-30 RX ADMIN — IBUPROFEN 800 MG: 400 TABLET ORAL at 09:06

## 2018-06-30 RX ADMIN — NIFEDIPINE 60 MG: 30 TABLET, FILM COATED, EXTENDED RELEASE ORAL at 09:06

## 2018-06-30 RX ADMIN — MUPIROCIN 1 G: 20 OINTMENT TOPICAL at 09:06

## 2018-06-30 RX ADMIN — IBUPROFEN 800 MG: 400 TABLET ORAL at 06:06

## 2018-06-30 NOTE — LACTATION NOTE
This note was copied from a baby's chart.  Instructed mother and grandmother on powdered formula preparation.  Discussed proper hand hygiene, cleaning and sterilization of BPA free bottles and checking expiration date of all formula.    Preparing Powdered Formula:   Remove plastic lid and wash lid with soap and water, dry and label with date   Clean top of can & open.  Remove scoop.   Follow s instructions on quantity of water and powder   Follow pediatricians recommendation on the type of water to use   Shake well prior to feeding   For pre-mixed formula - Refrigerate and use within 24 hours.  Re-warm individual bottles immediately prior to use.   Formula expires 1 hour after in initiation of the feeding  Instructed on the cleaning and sterilization of equipment for formula preparation:   Clean and disinfect working surface   Wash hands, arms and under fingernails with soap and water; dry using a clean cloth   Use bottle/nipple brush to wash all bottles, nipples, rings, caps and preparation utensils in hot soapy water before initial use and rinse   Sterilize all parts/utensils in boiling water or with a sterilization device prior to use   Continue to wash all parts with warm soapy water and rinse after each use and sterilize daily  Instructed on appropriate storage of formula if more than 1 bottle is prepared:   Put a clean nipple right side up on the bottle and cover with a nipple cap   Label each bottle with the date and time prepared   Refrigerate until feeding time   Warm immediately prior to use by a bottle warmer or by running under warm water   Do NOT microwave bottles   For formula remaining in the can, cover and refrigerate until needed.  Use within 48 hours   Formula expires 1 hour after in initiation of the feeding

## 2018-06-30 NOTE — PROGRESS NOTES
No complaints.  Pain is controlled.  No n/v.     Vital Signs (Most Recent):  Temp: 98.2 °F (36.8 °C) (06/30/18 0523)  Pulse: 87 (06/30/18 0523)  Resp: 19 (06/30/18 0000)  BP: 134/79 (06/30/18 0523)  SpO2: (!) 94 % (06/29/18 0048)    Vital Signs Range (Last 24H):  Temp:  [97.3 °F (36.3 °C)-98.7 °F (37.1 °C)]   Pulse:  [79-99]   Resp:  [19]   BP: (134-154)/(69-87)     Gen - NAD  Abd - soft, appropriately tender, non-distended  Incision - c/d/i        Recent Labs  Lab 06/28/18  0648   WBC 9.34   HGB 7.8*   HCT 24.2*          POD # 3  bp still elevated . Increased procardia this am . Pt wants to stay till tomorrow

## 2018-06-30 NOTE — PLAN OF CARE
Problem: Patient Care Overview  Goal: Individualization & Mutuality  Outcome: Ongoing (interventions implemented as appropriate)  VSS. Afebrile. Ambulating and voiding. Regular diet. Passing flatus. LTV incision with ss cd&i. Good pain control with schedule Motrin. POC discussed and understanding voiced. ANDREA

## 2018-06-30 NOTE — LACTATION NOTE
Management of Engorgement in the Non-Nursing Mother    Your breast milk will usually come in within 3-5 days after delivery even if you have decided not to breastfeed.  Your breasts may become full, lumpy, hard and uncomfortable due to the glands filling with milk and swelling of the breast tissue, blood vessels, and lymph glands.  This is a temporary condition usually lasting 24-48 hrs.  If your breasts become uncomfortable during this time, you may use some or all of the comfort measures described below to obtain relief.    Measures to relieve discomfort:    1. Wear a well fitting supportive bra. It should not be too tight or it may lead to plugged ducts or a breast infection.  (We do not recommend that you bind your breasts with any type of wrap.)    2. If the breasts begin to feel heavy, warm or uncomfortably full, begin using cold compresses on your breasts. Cold compresses can relieve the swelling and provide comfort.  Cold application can be in the form of ice packs, gel packs, frozen bags of vegetables or frozen wet towels. Cold compresses should be wrapped in a thin towel or a pillow case and applied to the breasts for 20 minutes at a time. This process can be repeated with a short break in between the applications of cold compresses until the swelling is relieved.     3.  Cold cabbage compresses can also be used to relieve pain and swelling.  Chilled cabbage leaves show similar pain reducing effects as cold compresses.  Some mothers prefer the cabbage leaves due to a stronger, more immediate effect. Cabbage leave effects are not clinically proven but many mothers find them very soothing.    How to apply chilled cabbage compresses:  rinse with cool water and refrigerate raw cabbage leaves.  Strip the large vein off the cold cabbage leaf and put the remaining part of the cabbage leaf directly on the breast. The leaves should be worn inside the bra until they wilt, usually within 2-4 hours.  When they wilt  they should be replaced with fresh leaves.   If redness, rash, or itching occur stop use immediately.    4. Anti-inflammatory medications such as ibuprofen may be taken, with your physicians approval, to reduce inflammation and discomfort.     6. If fullness persists and remains painful even after all of the above measures are used, hand expressing a small amount of milk out of the breasts can provide an extra measure of comfort.  Warm showers, letting the warm water hit your back and roll over your shoulders to the breasts, while you gently express milk can make hand expressing a little easier. You should only remove enough milk to provide comfort and relief.   Repeat this process as often as needed to keep the breasts comfortable.    7. You can pump your breasts and remove just enough milk to feel softer, but not so much that more milk production is stimulated.   Repeat this process as often as needed to keep the breasts comfortable.    8. There is no need to limit the amount of fluids that you drink.       9. Engorgement will usually get better within 24-48 hrs; however, there may be some fullness and/or leaking of milk for several days. Wear breast pads to absorb any leaking milk and change them frequently.    10. If you have any flu-like symptoms such as fever, chills, feeling tired and achy or red areas on your breast, call your physician immediately.  11.Call the [facility],  if the engorgement is not relieved or if you have questions.Instructed on Baby led bottle feeding.  Discussed:   Wash Hands   Hunger cues - hands to mouth, bending arms and legs toward the body, sucking noises, puckered lips and rooting/searching for the nipple   Method of feeding the baby  o always hold the baby upright, never prop a bottle  o brush the nipple across babys upper lip and wait to open  o hold bottle in a flat position, only partly full  o allow baby to pause and take breaks; burp as needed  o feeding  lasts about 15 - 20 minutes  o Stop feeding when fullness cues are present  o Fullness cues - sucking slows or stops, relaxed hands and arms, pushes away, falls asleep  Pt verbalized understanding and provided appropriate recall.

## 2018-07-01 VITALS
SYSTOLIC BLOOD PRESSURE: 128 MMHG | BODY MASS INDEX: 38.09 KG/M2 | DIASTOLIC BLOOD PRESSURE: 85 MMHG | OXYGEN SATURATION: 99 % | HEIGHT: 62 IN | HEART RATE: 74 BPM | WEIGHT: 207 LBS | TEMPERATURE: 99 F | RESPIRATION RATE: 18 BRPM

## 2018-07-01 PROCEDURE — 25000003 PHARM REV CODE 250: Performed by: OBSTETRICS & GYNECOLOGY

## 2018-07-01 RX ORDER — NIFEDIPINE 60 MG/1
60 TABLET, EXTENDED RELEASE ORAL DAILY
Qty: 30 TABLET | Refills: 11 | Status: SHIPPED | OUTPATIENT
Start: 2018-07-02 | End: 2019-07-02

## 2018-07-01 RX ORDER — ACETAMINOPHEN AND CODEINE PHOSPHATE 120; 12 MG/5ML; MG/5ML
1 SOLUTION ORAL DAILY
Qty: 28 TABLET | Refills: 11 | Status: SHIPPED | OUTPATIENT
Start: 2018-07-01 | End: 2018-07-17

## 2018-07-01 RX ADMIN — IBUPROFEN 800 MG: 400 TABLET ORAL at 05:07

## 2018-07-01 RX ADMIN — NIFEDIPINE 60 MG: 30 TABLET, FILM COATED, EXTENDED RELEASE ORAL at 09:07

## 2018-07-01 RX ADMIN — MUPIROCIN 1 G: 20 OINTMENT TOPICAL at 09:07

## 2018-07-01 RX ADMIN — FERROUS GLUCONATE TAB 324 MG (37.5 MG ELEMENTAL IRON) 324 MG: 324 (37.5 FE) TAB at 09:07

## 2018-07-01 RX ADMIN — DOCUSATE SODIUM 200 MG: 100 CAPSULE, LIQUID FILLED ORAL at 09:07

## 2018-07-01 NOTE — PROGRESS NOTES
Postop day 4  Patient doing well, no complaints. Ambulating. Lochia minimal,  denies n/v, denies h/a, vision changes, upper abd pain, chest pain, sob. Pain is controlled. Tolerating diet, PASSING flatus    Patient Active Problem List   Diagnosis    Alpha thalassemia trait    Supervision of other normal pregnancy, antepartum       Temp:  [98.5 °F (36.9 °C)-98.9 °F (37.2 °C)] 98.6 °F (37 °C)  Pulse:  [] 74  Resp:  [17-18] 18  SpO2:  [97 %-99 %] 99 %  BP: (128-139)/(73-91) 128/85  Alert, orientated  Regular rate  Normal resp effort  Abd soft nondistended fundus firm and appropriately tender  Incision clean, dry, intact; No erythema  Ext: no significant edema; nontender calves      Recent Labs  Lab 18  1147 18  0648   WBC 7.66 9.34   HGB 8.5* 7.8*   HCT 27.2* 24.2*    294   GROUPTRH O POS  --        Current Facility-Administered Medications:     calcium gluconate 100 mg/mL (10%) injection 1 g, 1 g, Intravenous, PRN, Lorna Cates MD    diphenhydrAMINE capsule 25 mg, 25 mg, Oral, Q4H PRN, Lorna Cates MD    docusate sodium capsule 200 mg, 200 mg, Oral, BID, Lorna Cates MD, 200 mg at 18 0904    ferrous gluconate tablet 324 mg, 324 mg, Oral, Daily with breakfast, Lorna Cates MD, 324 mg at 18 0904    hydrocortisone 2.5 % rectal cream, , Rectal, TID PRN, Lorna Cates MD    [] ketorolac injection 30 mg, 30 mg, Intravenous, Q6H, 30 mg at 18 0130 **FOLLOWED BY** ibuprofen tablet 800 mg, 800 mg, Oral, Q8H, Lorna Cates MD, 800 mg at 18 0545    lanolin cream, , Topical (Top), PRN, Lorna Cates MD    lanolin cream, , Topical (Top), PRN, Lorna Cates MD    magnesium hydroxide 400 mg/5 ml suspension 2,400 mg, 30 mL, Oral, BID PRN, Lorna Cates MD    magnesium sulfate in water 20 gram/500 mL (4 %) infusion, 2 g/hr, Intravenous, Continuous, Lorna  Fahad Cates MD, Stopped at 18 0028    measles, mumps and rubella vaccine 1,000-12,500 TCID50/0.5 mL injection 0.5 mL, 0.5 mL, Subcutaneous, vaccine x 1 dose, Lorna Cates MD    mupirocin 2 % ointment 1 g, 1 g, Nasal, BID, Lorna Cates MD, 1 g at 18 0905    naloxone 0.4 mg/mL injection 0.02 mg, 0.02 mg, Intravenous, PRN, Alba Love MD    NIFEdipine 24 hr tablet 60 mg, 60 mg, Oral, Daily, Michael Waller MD, 60 mg at 18 0905    ondansetron disintegrating tablet 8 mg, 8 mg, Oral, Q8H PRN, Lorna Cates MD, 8 mg at 18 0537    oxyCODONE-acetaminophen  mg per tablet 1 tablet, 1 tablet, Oral, Q4H PRN, Lorna Cates MD    oxyCODONE-acetaminophen 5-325 mg per tablet 1 tablet, 1 tablet, Oral, Q4H PRN, Lorna Cates MD    pramoxine-hydrocortisone cream, , Rectal, QID PRN, Lorna Cates MD    promethazine (PHENERGAN) 12.5 mg in dextrose 5 % 50 mL IVPB, 12.5 mg, Intravenous, Q6H PRN, Lorna Cates MD    senna-docusate 8.6-50 mg per tablet 1 tablet, 1 tablet, Oral, Nightly PRN, Lorna Cates MD    simethicone chewable tablet 80 mg, 1 tablet, Oral, Q6H PRN, Lorna Cates MD, 80 mg at 18 2145    Tdap vaccine injection 0.5 mL, 0.5 mL, Intramuscular, vaccine x 1 dose, Lorna Cates MD    A&P  23 y.o.  s/p c/d  Post op doing well.  Routine post op care.  Anemia- Iron recommended  HTN- will send home on procardia XL 60mg

## 2018-07-01 NOTE — PLAN OF CARE
Problem: Patient Care Overview  Goal: Discharge Needs Assessment  Outcome: Outcome(s) achieved Date Met: 07/01/18  Written and verbal discharged instructions given and discussed.

## 2018-07-01 NOTE — DISCHARGE INSTRUCTIONS
Management of Engorgement in the Non-Nursing Mother    Your breast milk will usually come in within 3-5 days after delivery even if you have decided not to breastfeed.  Your breasts may become full, lumpy, hard and uncomfortable due to the glands filling with milk and swelling of the breast tissue, blood vessels, and lymph glands.  This is a temporary condition usually lasting 24-48 hrs.  If your breasts become uncomfortable during this time, you may use some or all of the comfort measures described below to obtain relief.    Measures to relieve discomfort:    1. Wear a well fitting supportive bra. It should not be too tight or it may lead to plugged ducts or a breast infection.  (We do not recommend that you bind your breasts with any type of wrap.)    2. If the breasts begin to feel heavy, warm or uncomfortably full, begin using cold compresses on your breasts. Cold compresses can relieve the swelling and provide comfort.  Cold application can be in the form of ice packs, gel packs, frozen bags of vegetables or frozen wet towels. Cold compresses should be wrapped in a thin towel or a pillow case and applied to the breasts for 20 minutes at a time. This process can be repeated with a short break in between the applications of cold compresses until the swelling is relieved.     3.  Cold cabbage compresses can also be used to relieve pain and swelling.  Chilled cabbage leaves show similar pain reducing effects as cold compresses.  Some mothers prefer the cabbage leaves due to a stronger, more immediate effect. Cabbage leave effects are not clinically proven but many mothers find them very soothing.    How to apply chilled cabbage compresses:  rinse with cool water and refrigerate raw cabbage leaves.  Strip the large vein off the cold cabbage leaf and put the remaining part of the cabbage leaf directly on the breast. The leaves should be worn inside the bra until they wilt, usually within 2-4 hours.  When they wilt  they should be replaced with fresh leaves.   If redness, rash, or itching occur stop use immediately.    4. Anti-inflammatory medications such as ibuprofen may be taken, with your physicians approval, to reduce inflammation and discomfort.     6. If fullness persists and remains painful even after all of the above measures are used, hand expressing a small amount of milk out of the breasts can provide an extra measure of comfort.  Warm showers, letting the warm water hit your back and roll over your shoulders to the breasts, while you gently express milk can make hand expressing a little easier. You should only remove enough milk to provide comfort and relief.   Repeat this process as often as needed to keep the breasts comfortable.    7. You can pump your breasts and remove just enough milk to feel softer, but not so much that more milk production is stimulated.   Repeat this process as often as needed to keep the breasts comfortable.    8. There is no need to limit the amount of fluids that you drink.       9. Engorgement will usually get better within 24-48 hrs; however, there may be some fullness and/or leaking of milk for several days. Wear breast pads to absorb any leaking milk and change them frequently.    10. If you have any flu-like symptoms such as fever, chills, feeling tired and achy or red areas on your breast, call your physician immediately.  11.Call the [facility],  if the engorgement is not relieved or if you have questions.

## 2018-07-01 NOTE — DISCHARGE SUMMARY
23 y.o.   OB History      Para Term  AB Living    2 1 1 0 1 1    SAB TAB Ectopic Multiple Live Births    1 0 0 0 1        admitted for Delivery. See procedure note. Postpartum course was unremarkable.     Admit date 2018 10:44 AM  D/c date 2018    Disposition: Home  Activity 6 weeks pelvic rest  Diet: normal  Discharge followup 1 week, if . Follow up in 6 weeks if vaginal delivery  Meds listed separately

## 2018-07-16 NOTE — PHYSICIAN QUERY
PT Name: Lizy Scott  MR #: [id number]    Physician Query Form - Pathology Findings Clarification     CDS/: CELINE Dinh,RNC-[email or web address]         Contact information:[email or web address]  This form is a permanent document in the medical record.     Query Date: 2018      By submitting this query, we are merely seeking further clarification of documentation.  Please utilize your independent clinical judgment when addressing the question(s) below.      The medical record contains the following:     Findings Supporting Clinical Information Location in Medical Record   FINAL PATHOLOGIC DIAGNOSIS  Third trimester alcantar placenta (420 g):  -Acute chorioamnionitis, stage II grade I  -Diffuse meconium staining  -No vasculitis, other intrinsic vascular abnormalities or infarcts identified  -Eccentrically inserted trivascular umbilical cord with no inflammatory infiltrates or other gross or  microscopic abnormalities                            Section Procedure Note     Indications: failed induction    Pre-operative Diagnosis: 39 week 2 day pregnancy.    Findings:  Viable female infant with moderate meconium in cephalic presentation, wt pending, Apgars 9,9, normal uterus, tubes and ovaries Pathology report                         L&D Delivery note      Please document the clinical significance of the Pathologists findings of   Third trimester alcantar placenta (420 g):  -Acute chorioamnionitis, stage II grade I  -Diffuse meconium staining  -No vasculitis, other intrinsic vascular abnormalities or infarcts identified  -Eccentrically inserted trivascular umbilical cord with no inflammatory infiltrates or other gross or  microscopic abnormalities.          [ x ] I agree with the Pathology Findings        [  ] I do not agree with the Pathology Findings        [  ] Clinically Insignificant        [  ] Clinically Undetermined        [  ] Other/Clarification of Findings:  ______________________________________________    Please document in your progress notes daily for the duration of treatment until resolved and include in your discharge summary.

## 2019-08-02 ENCOUNTER — HOSPITAL ENCOUNTER (EMERGENCY)
Facility: HOSPITAL | Age: 24
Discharge: HOME | End: 2019-08-02
Attending: EMERGENCY MEDICINE
Payer: MEDICAID

## 2019-08-02 VITALS
HEIGHT: 62 IN | TEMPERATURE: 98 F | RESPIRATION RATE: 18 BRPM | DIASTOLIC BLOOD PRESSURE: 58 MMHG | SYSTOLIC BLOOD PRESSURE: 126 MMHG | OXYGEN SATURATION: 95 % | HEART RATE: 92 BPM | WEIGHT: 203 LBS | BODY MASS INDEX: 37.36 KG/M2

## 2019-08-02 DIAGNOSIS — O20.0 THREATENED MISCARRIAGE: ICD-10-CM

## 2019-08-02 DIAGNOSIS — N93.9 VAGINAL BLEEDING: Primary | ICD-10-CM

## 2019-08-02 LAB
ABO + RH BLD: NORMAL
ALBUMIN SERPL BCP-MCNC: 4.1 G/DL (ref 3.5–5.2)
ALP SERPL-CCNC: 87 U/L (ref 55–135)
ALT SERPL W/O P-5'-P-CCNC: 13 U/L (ref 10–44)
ANION GAP SERPL CALC-SCNC: 7 MMOL/L (ref 8–16)
ANISOCYTOSIS BLD QL SMEAR: SLIGHT
AST SERPL-CCNC: 16 U/L (ref 10–40)
B-HCG UR QL: POSITIVE
BACTERIA #/AREA URNS HPF: ABNORMAL /HPF
BASOPHILS # BLD AUTO: 0.03 K/UL (ref 0–0.2)
BASOPHILS NFR BLD: 0.4 % (ref 0–1.9)
BILIRUB SERPL-MCNC: 0.2 MG/DL (ref 0.1–1)
BILIRUB UR QL STRIP: NEGATIVE
BUN SERPL-MCNC: 7 MG/DL (ref 6–20)
CALCIUM SERPL-MCNC: 9.6 MG/DL (ref 8.7–10.5)
CHLORIDE SERPL-SCNC: 108 MMOL/L (ref 95–110)
CLARITY UR: ABNORMAL
CO2 SERPL-SCNC: 25 MMOL/L (ref 23–29)
COLOR UR: ABNORMAL
CREAT SERPL-MCNC: 0.9 MG/DL (ref 0.5–1.4)
CTP QC/QA: YES
DIFFERENTIAL METHOD: ABNORMAL
EOSINOPHIL # BLD AUTO: 0.2 K/UL (ref 0–0.5)
EOSINOPHIL NFR BLD: 2.4 % (ref 0–8)
ERYTHROCYTE [DISTWIDTH] IN BLOOD BY AUTOMATED COUNT: 15.7 % (ref 11.5–14.5)
EST. GFR  (AFRICAN AMERICAN): >60 ML/MIN/1.73 M^2
EST. GFR  (NON AFRICAN AMERICAN): >60 ML/MIN/1.73 M^2
GLUCOSE SERPL-MCNC: 85 MG/DL (ref 70–110)
GLUCOSE UR QL STRIP: NEGATIVE
HCG INTACT+B SERPL-ACNC: NORMAL MIU/ML
HCT VFR BLD AUTO: 33 % (ref 37–48.5)
HGB BLD-MCNC: 9.8 G/DL (ref 12–16)
HGB UR QL STRIP: ABNORMAL
HYALINE CASTS #/AREA URNS LPF: ABNORMAL /LPF
HYPOCHROMIA BLD QL SMEAR: ABNORMAL
KETONES UR QL STRIP: NEGATIVE
LEUKOCYTE ESTERASE UR QL STRIP: ABNORMAL
LYMPHOCYTES # BLD AUTO: 2.1 K/UL (ref 1–4.8)
LYMPHOCYTES NFR BLD: 28 % (ref 18–48)
MCH RBC QN AUTO: 20.9 PG (ref 27–31)
MCHC RBC AUTO-ENTMCNC: 29.7 G/DL (ref 32–36)
MCV RBC AUTO: 70 FL (ref 82–98)
MICROSCOPIC COMMENT: ABNORMAL
MONOCYTES # BLD AUTO: 0.7 K/UL (ref 0.3–1)
MONOCYTES NFR BLD: 8.9 % (ref 4–15)
NEUTROPHILS # BLD AUTO: 4.6 K/UL (ref 1.8–7.7)
NEUTROPHILS NFR BLD: 60.4 % (ref 38–73)
NITRITE UR QL STRIP: NEGATIVE
PH UR STRIP: 6 [PH] (ref 5–8)
PLATELET # BLD AUTO: 422 K/UL (ref 150–350)
PMV BLD AUTO: 10.6 FL (ref 9.2–12.9)
POLYCHROMASIA BLD QL SMEAR: ABNORMAL
POTASSIUM SERPL-SCNC: 3.8 MMOL/L (ref 3.5–5.1)
PROT SERPL-MCNC: 8.1 G/DL (ref 6–8.4)
PROT UR QL STRIP: ABNORMAL
RBC # BLD AUTO: 4.69 M/UL (ref 4–5.4)
RBC #/AREA URNS HPF: >100 /HPF (ref 0–4)
SODIUM SERPL-SCNC: 140 MMOL/L (ref 136–145)
SP GR UR STRIP: 1.02 (ref 1–1.03)
SQUAMOUS #/AREA URNS HPF: ABNORMAL /HPF
URN SPEC COLLECT METH UR: ABNORMAL
UROBILINOGEN UR STRIP-ACNC: ABNORMAL EU/DL
WBC # BLD AUTO: 7.54 K/UL (ref 3.9–12.7)
WBC #/AREA URNS HPF: 0 /HPF (ref 0–5)

## 2019-08-02 PROCEDURE — 63600175 PHARM REV CODE 636 W HCPCS: Performed by: PHYSICIAN ASSISTANT

## 2019-08-02 PROCEDURE — 84702 CHORIONIC GONADOTROPIN TEST: CPT

## 2019-08-02 PROCEDURE — 99284 EMERGENCY DEPT VISIT MOD MDM: CPT | Mod: 25

## 2019-08-02 PROCEDURE — 80053 COMPREHEN METABOLIC PANEL: CPT

## 2019-08-02 PROCEDURE — 87086 URINE CULTURE/COLONY COUNT: CPT

## 2019-08-02 PROCEDURE — 86901 BLOOD TYPING SEROLOGIC RH(D): CPT

## 2019-08-02 PROCEDURE — 81000 URINALYSIS NONAUTO W/SCOPE: CPT

## 2019-08-02 PROCEDURE — 81025 URINE PREGNANCY TEST: CPT | Performed by: EMERGENCY MEDICINE

## 2019-08-02 PROCEDURE — 96374 THER/PROPH/DIAG INJ IV PUSH: CPT

## 2019-08-02 PROCEDURE — 96361 HYDRATE IV INFUSION ADD-ON: CPT

## 2019-08-02 PROCEDURE — 85025 COMPLETE CBC W/AUTO DIFF WBC: CPT

## 2019-08-02 RX ORDER — OXYCODONE AND ACETAMINOPHEN 5; 325 MG/1; MG/1
1 TABLET ORAL
Qty: 8 TABLET | Refills: 0 | Status: SHIPPED | OUTPATIENT
Start: 2019-08-02

## 2019-08-02 RX ORDER — PROMETHAZINE HYDROCHLORIDE 25 MG/1
25 TABLET ORAL EVERY 6 HOURS PRN
Qty: 10 TABLET | Refills: 0 | Status: SHIPPED | OUTPATIENT
Start: 2019-08-02

## 2019-08-02 RX ORDER — HYDROMORPHONE HYDROCHLORIDE 2 MG/ML
1 INJECTION, SOLUTION INTRAMUSCULAR; INTRAVENOUS; SUBCUTANEOUS
Status: COMPLETED | OUTPATIENT
Start: 2019-08-02 | End: 2019-08-02

## 2019-08-02 RX ADMIN — SODIUM CHLORIDE 1000 ML: 0.9 INJECTION, SOLUTION INTRAVENOUS at 10:08

## 2019-08-02 RX ADMIN — HYDROMORPHONE HYDROCHLORIDE 1 MG: 2 INJECTION, SOLUTION INTRAMUSCULAR; INTRAVENOUS; SUBCUTANEOUS at 10:08

## 2019-08-03 NOTE — DISCHARGE INSTRUCTIONS
Rest your pelvis. Avoid prolonged standing or strenuous physical activity.  Make sure you are drinking plenty of fluids.  You will need to follow-up with OBGYN within 2-3 days for repeat labs and ultrasound as we discussed for further evaluation of this pregnancy.  You can take Tylenol as needed for mild-to-moderate pain. You have been prescribed Percocet to take for severe pain. This medication causes drowsiness, do not swim, drink alcohol or operate motor vehicles while taking this medication.   You were prescribed Phenergan for nausea.This medication may cause drowsiness.    Return to the ER for any emergency or concern.

## 2019-08-03 NOTE — ED TRIAGE NOTES
Patient came to the ED because she feels like she is having a miscarriage. Patient is having lower abdominal pain and vaginal bleeding

## 2019-08-03 NOTE — ED PROVIDER NOTES
"Encounter Date: 2019       History     Chief Complaint   Patient presents with    Vaginal Bleeding     Pt states, "I think I am having a miscarriage." Pt reports she is 8-9 weeks pregnant. Pt states she started spotting on  and today was heavier with cramps.     CC: Vaginal bleeding    HPI: 24 y.o. 10 weeks gravid A1 presents for consideration of vaginal bleeding.  Patient reports that vaginal spotting began on  and has progressively worsened throughout the week.  She reports that today she is having heavy vaginal bleeding similar to a menstrual period, with associated lower abdominal cramping which she reports feels like contractions." She denies fever, nausea, emesis, urinary symptoms or further complaints. She denies attempted tx PTA.         Review of patient's allergies indicates:  No Known Allergies  Past Medical History:   Diagnosis Date    Alpha thalassemia trait      Past Surgical History:   Procedure Laterality Date     SECTION N/A 2018    Performed by Lorna Cates MD at [facility] L&D OR     Family History   Problem Relation Age of Onset    Hypertension Mother      Social History     Tobacco Use    Smoking status: Never Smoker    Smokeless tobacco: Never Used   Substance Use Topics    Alcohol use: No    Drug use: Never     Review of Systems   Constitutional: Negative for chills and fever.   HENT: Negative for congestion and sore throat.    Eyes: Negative for pain.   Respiratory: Negative for cough and shortness of breath.    Cardiovascular: Negative for chest pain.   Gastrointestinal: Positive for abdominal pain. Negative for constipation, diarrhea, nausea and vomiting.   Genitourinary: Positive for vaginal bleeding. Negative for decreased urine volume, dysuria, vaginal discharge and vaginal pain.   Musculoskeletal: Negative for back pain and neck pain.   Skin: Negative for rash.   Neurological: Negative for weakness and headaches.   Hematological: Does not " bruise/bleed easily.   Psychiatric/Behavioral: Negative for confusion.       Physical Exam     Initial Vitals [08/02/19 2010]   BP Pulse Resp Temp SpO2   137/83 99 18 98.7 °F (37.1 °C) 100 %      MAP       --         Physical Exam    Nursing note and vitals reviewed.  Constitutional: Vital signs are normal. She appears well-developed and well-nourished. She is not diaphoretic. She is cooperative.  Non-toxic appearance. She does not have a sickly appearance. She does not appear ill. No distress.   HENT:   Head: Normocephalic and atraumatic.   Right Ear: External ear normal.   Left Ear: External ear normal.   Nose: Nose normal.   Mouth/Throat: Uvula is midline, oropharynx is clear and moist and mucous membranes are normal. No oropharyngeal exudate.   Eyes: Conjunctivae, EOM and lids are normal. Pupils are equal, round, and reactive to light.   Neck: Trachea normal, normal range of motion, full passive range of motion without pain and phonation normal. Neck supple.   Cardiovascular: Normal rate, regular rhythm, normal heart sounds and intact distal pulses. Exam reveals no gallop and no friction rub.    No murmur heard.  Pulmonary/Chest: Effort normal and breath sounds normal. No respiratory distress. She has no decreased breath sounds. She has no wheezes. She has no rhonchi. She has no rales.   Abdominal: Soft. Normal appearance and bowel sounds are normal. She exhibits no distension and no mass. There is no tenderness. There is no rigidity, no rebound, no guarding and no CVA tenderness.   Genitourinary: Pelvic exam was performed with patient supine. There is no rash, tenderness, lesion or injury on the right labia. There is no rash, tenderness, lesion or injury on the left labia. Cervix exhibits no motion tenderness, no discharge and no friability. There is bleeding (moderate amount in vaginal vault without hemorrhaging) in the vagina. No erythema or tenderness in the vagina. No foreign body in the vagina. No signs of  injury around the vagina. No vaginal discharge found.   Genitourinary Comments: Cervical os appears open 1 fingertip.     exam performed with Gina HENRY at bedside.    Musculoskeletal: Normal range of motion.   Neurological: She is alert and oriented to person, place, and time.   Skin: Skin is warm and dry. Capillary refill takes less than 2 seconds. No rash noted.   Psychiatric: She has a normal mood and affect. Her speech is normal and behavior is normal. Judgment and thought content normal. Cognition and memory are normal.         ED Course   Procedures  Labs Reviewed   CBC W/ AUTO DIFFERENTIAL - Abnormal; Notable for the following components:       Result Value    Hemoglobin 9.8 (*)     Hematocrit 33.0 (*)     Mean Corpuscular Volume 70 (*)     Mean Corpuscular Hemoglobin 20.9 (*)     Mean Corpuscular Hemoglobin Conc 29.7 (*)     RDW 15.7 (*)     Platelets 422 (*)     All other components within normal limits   COMPREHENSIVE METABOLIC PANEL - Abnormal; Notable for the following components:    Anion Gap 7 (*)     All other components within normal limits   URINALYSIS - Abnormal; Notable for the following components:    Color, UA Red (*)     Appearance, UA Cloudy (*)     Protein, UA 3+ (*)     Occult Blood UA 3+ (*)     Urobilinogen, UA 4.0-6.0 (*)     Leukocytes, UA 1+ (*)     All other components within normal limits   URINALYSIS MICROSCOPIC - Abnormal; Notable for the following components:    RBC, UA >100 (*)     All other components within normal limits   POCT URINE PREGNANCY - Abnormal; Notable for the following components:    POC Preg Test, Ur Positive (*)     All other components within normal limits   CULTURE, URINE   HCG, QUANTITATIVE, PREGNANCY   GROUP & RH          Imaging Results          US OB Less Than 14 Wks with Transvag(xpd (Final result)  Result time 08/02/19 22:49:27    Final result by Oseas Segura MD (08/02/19 22:49:27)                 Impression:      Pregnancy of unknown viability.   "Intrauterine gestational sac is seen which would correspond with estimated gestational age of 6 weeks 2 days, although no fetal pole is seen at this time.  Small amount of suspected debris is seen within the gestational sac.  Recommend serial beta HCGs and repeat pelvic ultrasound in 1-2 weeks.      Electronically signed by: Oseas Segura MD  Date:    2019  Time:    22:49             Narrative:    EXAMINATION:  US OB LESS THAN 14 WKS WITH TRANSVAGINAL (XPD)    CLINICAL HISTORY:  Vag Bleeding;    TECHNIQUE:  Transabdominal sonography of the pelvis was performed, followed by transvaginal sonography to better evaluate the uterus and ovaries.    COMPARISON:  None.    FINDINGS:  The uterus measures 9.5 x 6.1 x 4.8 cm. Uterine parenchyma is heterogenous in echotexture. In the uterine cavity there is a gestational sac with a mean diameter of 1.4 cm which would correspond with estimated gestational age of 6 weeks 2 days.  Nonspecific debris is seen within the gestational sac.  No fetal pole or yolk sac are seen at this time.    The right ovary measures 1.9 x 1.1 x 1.7 cm.  Arterial and venous flow is preserved on the right.  Suspected corpus luteum cyst is seen measuring 1.1 cm.  Left ovary was unable to be visualized.  No adnexal abnormalities are seen.  Small amount of physiologic free fluid is seen.                                       APC / Resident Notes:   24 y.o. 10 weeks gravid A1 presents for consideration of vaginal bleeding.  Patient reports that vaginal spotting began on  and has progressively worsened throughout the week.  She reports that today she is having heavy vaginal bleeding similar to a menstrual period, with associated lower abdominal cramping which she reports "feels like contractions." No further symptoms. LMP 19.    Exam findings: Patient is non-toxic, afebrile and well appearing.  exam revealing for moderate vaginal bleeding in vaginal vault. Cervical os open 1 fingertip. " Abdominal exam benign.     If available, past records have been reviewed.  Vitals are reassuring.  Results:   UPT positive  Urinalysis unrevealing for infection  No leukocytosis or severe anemia  No electrolyte abnormality  Quantitative HCG [id number]  ABO Rh O positive  Transvaginal ultrasound shows pregnancy of unknown viability.  Intrauterine gestational sac is seen which would correspond to 6 weeks 2 days estimated gestational age, although no fetal pole is seen.  There is a small amount of suspected debris within the gestational sac. No evidence of ectopic pregnancy.    My overall impression: vaginal bleeding, threatened miscarriage  DDx: vaginal bleeding, threatened miscarriage, fetal demise, subchorionic hemorrhage, miscarriage, other    ED course: IV fluid rehydration and dilaudid given for pain control; I have discussed with risks of opioids during pregnancy and patient verbalized understanding of risks of verbally consented to receipt of this medication. I will recommend pelvic rest and avoidance of strenuous physical activity.  I will prescribe Percocet and Phenergan for symptomatic relief.  I will encourage patient to follow up within 2-3 days with OBGYN for repeat ultrasound and lab work for further evaluation of this pregnancy.  Strict ED return precautions given. Patient stable for discharge.     The diagnosis and treatment plan have been discussed with the patient. All questions and concerns have been addressed. Patient expressed understanding. An educational information sheet was given to the patient prior to discharge.     Mehnaz Keen PA-C                   Clinical Impression:       ICD-10-CM ICD-9-CM   1. Vaginal bleeding N93.9 623.8   2. Threatened miscarriage O20.0 640.00         Disposition:   Disposition: Discharged  Condition: Stable                        Mehnaz eKen PA-C  08/03/19 0339

## 2019-08-04 LAB — BACTERIA UR CULT: NORMAL

## 2020-01-30 ENCOUNTER — HOSPITAL ENCOUNTER (EMERGENCY)
Facility: HOSPITAL | Age: 25
Discharge: HOME | End: 2020-01-31
Attending: EMERGENCY MEDICINE
Payer: MEDICAID

## 2020-01-30 DIAGNOSIS — O20.9 VAGINAL BLEEDING IN PREGNANCY, FIRST TRIMESTER: ICD-10-CM

## 2020-01-30 DIAGNOSIS — Z3A.01 6 WEEKS GESTATION OF PREGNANCY: Primary | ICD-10-CM

## 2020-01-30 DIAGNOSIS — O20.0 THREATENED MISCARRIAGE: ICD-10-CM

## 2020-01-30 LAB
ABO + RH BLD: NORMAL
ALBUMIN SERPL BCP-MCNC: 4 G/DL (ref 3.5–5.2)
ALP SERPL-CCNC: 85 U/L (ref 55–135)
ALT SERPL W/O P-5'-P-CCNC: 11 U/L (ref 10–44)
ANION GAP SERPL CALC-SCNC: 9 MMOL/L (ref 8–16)
AST SERPL-CCNC: 17 U/L (ref 10–40)
B-HCG UR QL: POSITIVE
BASOPHILS # BLD AUTO: 0.02 K/UL (ref 0–0.2)
BASOPHILS NFR BLD: 0.3 % (ref 0–1.9)
BILIRUB SERPL-MCNC: 0.2 MG/DL (ref 0.1–1)
BILIRUB UR QL STRIP: NEGATIVE
BUN SERPL-MCNC: 7 MG/DL (ref 6–20)
CALCIUM SERPL-MCNC: 9.3 MG/DL (ref 8.7–10.5)
CHLORIDE SERPL-SCNC: 107 MMOL/L (ref 95–110)
CLARITY UR: CLEAR
CO2 SERPL-SCNC: 22 MMOL/L (ref 23–29)
COLOR UR: COLORLESS
CREAT SERPL-MCNC: 0.8 MG/DL (ref 0.5–1.4)
CTP QC/QA: YES
DIFFERENTIAL METHOD: ABNORMAL
EOSINOPHIL # BLD AUTO: 0.1 K/UL (ref 0–0.5)
EOSINOPHIL NFR BLD: 1.9 % (ref 0–8)
ERYTHROCYTE [DISTWIDTH] IN BLOOD BY AUTOMATED COUNT: 15.9 % (ref 11.5–14.5)
EST. GFR  (AFRICAN AMERICAN): >60 ML/MIN/1.73 M^2
EST. GFR  (NON AFRICAN AMERICAN): >60 ML/MIN/1.73 M^2
GLUCOSE SERPL-MCNC: 89 MG/DL (ref 70–110)
GLUCOSE UR QL STRIP: NEGATIVE
HCG INTACT+B SERPL-ACNC: 3185 MIU/ML
HCT VFR BLD AUTO: 33.8 % (ref 37–48.5)
HGB BLD-MCNC: 9.6 G/DL (ref 12–16)
HGB UR QL STRIP: ABNORMAL
IMM GRANULOCYTES # BLD AUTO: 0.01 K/UL (ref 0–0.04)
IMM GRANULOCYTES NFR BLD AUTO: 0.1 % (ref 0–0.5)
KETONES UR QL STRIP: NEGATIVE
LEUKOCYTE ESTERASE UR QL STRIP: NEGATIVE
LYMPHOCYTES # BLD AUTO: 2.1 K/UL (ref 1–4.8)
LYMPHOCYTES NFR BLD: 28.4 % (ref 18–48)
MCH RBC QN AUTO: 20.1 PG (ref 27–31)
MCHC RBC AUTO-ENTMCNC: 28.4 G/DL (ref 32–36)
MCV RBC AUTO: 71 FL (ref 82–98)
MICROSCOPIC COMMENT: NORMAL
MONOCYTES # BLD AUTO: 0.6 K/UL (ref 0.3–1)
MONOCYTES NFR BLD: 8.3 % (ref 4–15)
NEUTROPHILS # BLD AUTO: 4.5 K/UL (ref 1.8–7.7)
NEUTROPHILS NFR BLD: 61 % (ref 38–73)
NITRITE UR QL STRIP: NEGATIVE
NRBC BLD-RTO: 0 /100 WBC
PH UR STRIP: 6 [PH] (ref 5–8)
PLATELET # BLD AUTO: 383 K/UL (ref 150–350)
PMV BLD AUTO: 10.6 FL (ref 9.2–12.9)
POTASSIUM SERPL-SCNC: 4.3 MMOL/L (ref 3.5–5.1)
PROT SERPL-MCNC: 8 G/DL (ref 6–8.4)
PROT UR QL STRIP: NEGATIVE
RBC # BLD AUTO: 4.78 M/UL (ref 4–5.4)
RBC #/AREA URNS HPF: 3 /HPF (ref 0–4)
SODIUM SERPL-SCNC: 138 MMOL/L (ref 136–145)
SP GR UR STRIP: 1 (ref 1–1.03)
URN SPEC COLLECT METH UR: ABNORMAL
UROBILINOGEN UR STRIP-ACNC: NEGATIVE EU/DL
WBC # BLD AUTO: 7.39 K/UL (ref 3.9–12.7)

## 2020-01-30 PROCEDURE — 87086 URINE CULTURE/COLONY COUNT: CPT

## 2020-01-30 PROCEDURE — 63600175 PHARM REV CODE 636 W HCPCS: Performed by: NURSE PRACTITIONER

## 2020-01-30 PROCEDURE — 96360 HYDRATION IV INFUSION INIT: CPT

## 2020-01-30 PROCEDURE — 80053 COMPREHEN METABOLIC PANEL: CPT

## 2020-01-30 PROCEDURE — 81025 URINE PREGNANCY TEST: CPT | Performed by: NURSE PRACTITIONER

## 2020-01-30 PROCEDURE — 85025 COMPLETE CBC W/AUTO DIFF WBC: CPT

## 2020-01-30 PROCEDURE — 96361 HYDRATE IV INFUSION ADD-ON: CPT

## 2020-01-30 PROCEDURE — 81000 URINALYSIS NONAUTO W/SCOPE: CPT

## 2020-01-30 PROCEDURE — 86901 BLOOD TYPING SEROLOGIC RH(D): CPT

## 2020-01-30 PROCEDURE — 99284 EMERGENCY DEPT VISIT MOD MDM: CPT | Mod: 25

## 2020-01-30 PROCEDURE — 84702 CHORIONIC GONADOTROPIN TEST: CPT

## 2020-01-30 RX ADMIN — SODIUM CHLORIDE 1000 ML: 0.9 INJECTION, SOLUTION INTRAVENOUS at 09:01

## 2020-01-31 VITALS
BODY MASS INDEX: 38.28 KG/M2 | WEIGHT: 208 LBS | RESPIRATION RATE: 16 BRPM | OXYGEN SATURATION: 100 % | HEIGHT: 62 IN | TEMPERATURE: 98 F | SYSTOLIC BLOOD PRESSURE: 139 MMHG | DIASTOLIC BLOOD PRESSURE: 89 MMHG | HEART RATE: 89 BPM

## 2020-01-31 NOTE — DISCHARGE INSTRUCTIONS
Follow-up with your OBGYN on Monday as scheduled.  Take prenatal vitamins once daily if you are not already doing so.    Return to the emergency department immediately for any new or worsening symptoms.    Thank you for coming to our Emergency Department today. It is important to remember that some problems are difficult to diagnose and may not be found during your first visit. Be sure to follow up with your primary care doctor.  If you do not have one, you may contact the one listed on your discharge paperwork or you may also call the [facility] Appointment Desk at [phone] to schedule an appointment with one.     Return to the ER with any questions/concerns, new/concerning symptoms, worsening or failure to improve. Do not drive or make any important decisions for 24 hours if you have received any pain medications, sedatives or mood altering drugs during your ER visit.

## 2020-01-31 NOTE — ED PROVIDER NOTES
Encounter Date: 2020    SCRIBE #1 NOTE: I, Percy Dunawaychem, am scribing for, and in the presence of,  Jaciel Talbert NP. I have scribed the following portions of the note - Other sections scribed: HPI, ROS, PE.       History     Chief Complaint   Patient presents with    Vaginal Bleeding     The pateint reports light vaginal bleeding since 1630 today. Pt states that she is pregnant but is unsure of due date. LMP was 2019. Denies abdominal pain,n/v/d, fevers, chills, dysuria, back pain.     CC: Vaginal Bleeding    HPI:  This is a 25 y.o. female with no pertinent PMHx who presents to the Emergency Department with a cc of vaginal bleeding beginning 6 hrs PTA. Pt is currently pregnant but is unsure how far along she is, but her last menstrual period was on . Pt is asymptomatic otherwise at time of ED visit. She reports that she has not had an ultrasound yet, but has an appointment with Dr. Lorna Cates MD in 3 days.  She denies abdominal pain, abdominal cramping, nausea, vomiting, dysuria, hematuria, vaginal discharge, flank pain, headache, or any additional symptoms. No medications or other treatments attempted for her symptoms.            Review of patient's allergies indicates:  No Known Allergies  Past Medical History:   Diagnosis Date    Alpha thalassemia trait      Past Surgical History:   Procedure Laterality Date     SECTION N/A 2018    Procedure:  SECTION;  Surgeon: Lorna Cates MD;  Location: [facility] L&D OR;  Service: OB/GYN;  Laterality: N/A;     Family History   Problem Relation Age of Onset    Hypertension Mother      Social History     Tobacco Use    Smoking status: Never Smoker    Smokeless tobacco: Never Used   Substance Use Topics    Alcohol use: No    Drug use: Never     Review of Systems   Constitutional: Negative for chills, fatigue and fever.   HENT: Negative for congestion, ear pain, nosebleeds, postnasal drip, rhinorrhea,  sinus pressure and sore throat.    Eyes: Negative for photophobia, pain and visual disturbance.   Respiratory: Negative for apnea, cough, choking, chest tightness, shortness of breath, wheezing and stridor.    Cardiovascular: Negative for chest pain, palpitations and leg swelling.   Gastrointestinal: Negative for abdominal pain, constipation, diarrhea, nausea and vomiting.   Genitourinary: Positive for vaginal bleeding. Negative for dysuria, flank pain, hematuria, pelvic pain and vaginal discharge.   Musculoskeletal: Negative for arthralgias, back pain, gait problem and neck pain.   Skin: Negative for color change, pallor, rash and wound.   Neurological: Negative for dizziness, seizures, syncope, facial asymmetry, weakness, light-headedness and headaches.   Hematological: Negative for adenopathy. Does not bruise/bleed easily.   Psychiatric/Behavioral: Negative for confusion. The patient is not nervous/anxious.        Physical Exam     Initial Vitals [01/30/20 2043]   BP Pulse Resp Temp SpO2   139/77 85 16 98.2 °F (36.8 °C) 98 %      MAP       --         Physical Exam    Nursing note and vitals reviewed.  Constitutional: Vital signs are normal. She appears well-developed and well-nourished. She is not diaphoretic. She is Obese . She is active and cooperative.  Non-toxic appearance. She does not have a sickly appearance. She does not appear ill. No distress.   Afebrile, pleasant, well appearing, no distress   HENT:   Head: Normocephalic and atraumatic.   Right Ear: Tympanic membrane normal.   Left Ear: Tympanic membrane normal.   Nose: Nose normal.   Mouth/Throat: Uvula is midline, oropharynx is clear and moist and mucous membranes are normal.   Eyes: EOM are normal. Pupils are equal, round, and reactive to light.   Neck: Normal range of motion. Neck supple.   Cardiovascular: Normal rate, regular rhythm and normal heart sounds. Exam reveals no gallop and no friction rub.    No murmur heard.  Pulmonary/Chest: Effort  normal and breath sounds normal. No respiratory distress. She has no wheezes. She has no rhonchi. She has no rales.   Abdominal: Soft. Bowel sounds are normal. There is no tenderness. There is no rigidity, no rebound, no guarding, no CVA tenderness, no tenderness at McBurney's point and negative López's sign.   No abdominal tenderness to palpation.  Abdomen is soft without rigidity or guarding.   Musculoskeletal: Normal range of motion.   Neurological: She is alert and oriented to person, place, and time. She has normal strength. No cranial nerve deficit or sensory deficit.   Skin: Skin is warm and dry. Capillary refill takes less than 2 seconds.   Psychiatric: She has a normal mood and affect.         ED Course   Procedures  Labs Reviewed   CBC W/ AUTO DIFFERENTIAL - Abnormal; Notable for the following components:       Result Value    Hemoglobin 9.6 (*)     Hematocrit 33.8 (*)     Mean Corpuscular Volume 71 (*)     Mean Corpuscular Hemoglobin 20.1 (*)     Mean Corpuscular Hemoglobin Conc 28.4 (*)     RDW 15.9 (*)     Platelets 383 (*)     All other components within normal limits   COMPREHENSIVE METABOLIC PANEL - Abnormal; Notable for the following components:    CO2 22 (*)     All other components within normal limits   URINALYSIS - Abnormal; Notable for the following components:    Color, UA Colorless (*)     Occult Blood UA 2+ (*)     All other components within normal limits   POCT URINE PREGNANCY - Abnormal; Notable for the following components:    POC Preg Test, Ur Positive (*)     All other components within normal limits   CULTURE, URINE   HCG, QUANTITATIVE, PREGNANCY   URINALYSIS MICROSCOPIC   GROUP & RH          Imaging Results          US OB <14 Wks TransAbd & TransVag, Single Gestation (XPD) (Final result)  Result time 01/31/20 00:08:03    Final result by Gopal Melgar MD (01/31/20 00:08:03)                 Impression:      Single live early intrauterine pregnancy noted, crown-rump length  measurement corresponds to approximate gestational age of 6 weeks 2 days.    Fetal heart rate measures approximately 97 beats per minute somewhat borderline low, follow-up recommended.    1.5 cm complex lesion of the right ovary may relate to complex corpus luteal cyst, there is also a 1.2 cm follicle/cyst noted.      Electronically signed by: Gopal Melgar  Date:    01/31/2020  Time:    00:08             Narrative:    EXAMINATION:  US OB <14 WEEKS, TRANSABDOM & TRANSVAG, SINGLE GESTATION (XPD)    CLINICAL HISTORY:  vaginal bleeding early pregnancy;    TECHNIQUE:  Transabdominal sonography of the pelvis was performed, followed by transvaginal sonography to better evaluate the uterus and ovaries.    COMPARISON:  None.    FINDINGS:  The uterus measures approximately 8.5 cm in length on transabdominal imaging, 6.9 cm in length on transvaginal imaging, the uterus appears retroflexed.  An intrauterine gestational sac is noted within the gestational sac a fetal pole is noted with a crown-rump length measurement corresponding to approximate gestational age of 6 weeks 2 days.  Fetal heart rate is noted measuring approximately 97 beats per minute.  This is borderline low, follow-up is recommended.    The maternal right ovary measures approximately 3.2 x 1.6 by 2.6 cm in size there is a 0.9 x 1.3 by 1.5 cm complex lesion potentially complex corpus luteal cyst.  There is a 1.2 x 0.7 by 1.2 cm follicle/cyst.  Color Doppler imaging demonstrates flow to the right ovary.  The maternal left ovary measures approximately 2.9 x 1.4 by 1.4 cm in size.  Color Doppler imaging demonstrates flow to the left ovary.  There is a small amount of free fluid at the cul-de-sac and right adnexa large amount of free fluid is not seen.                                 Medical Decision Making:   History:   Old Medical Records: I decided to obtain old medical records.  Clinical Tests:   Lab Tests: Ordered and Reviewed  Radiological Study: Ordered and  Reviewed  ED Management:  HPI and physical exam as above.    Patient is afebrile, pleasant, well appearing, in no distress. Abdominal exam is benign.  Physical exam is unremarkable. CBC with microcytic, hypochromic anemia.  Will prescribe prenatal vitamins with iron.  Remaining labs are unremarkable.  HCG is 3185.  Ultrasound shows an IUP measuring 6 weeks and 2 days with borderline fetal bradycardia of 97 beats per minute. Urinalysis without evidence of infection.  High-risk patient therefore urine culture is in process.  No evidence of ectopic pregnancy.  Advised patient to follow up with her OBGYN within 1 week for repeat HCG and repeat ultrasound.  Patient has an appointment with her OBGYN on Monday.  Strict and specific ED return precautions given. All questions regarding diagnosis and plan were answered to the patient's fullest possible satisfaction. Patient expressed understanding of diagnosis, discharge instructions, and return precautions.            Patient note was created using [email or web address] voice dictation software.  Any errors in syntax or information may not have been identified and edited prior to signing this note.      Additional MDM:   Differential Diagnosis:   Symptom: Abdominal pain. <> The follow diagnoses were considered and will be evaluated: Ectopic Pregnancy, Impending , Ovarian Cyst, Threatened  and Uterine Fibroids.   Other: The following diagnoses were also considered and will be evaluated: Subchorionic hemorrhage.          Scribe Attestation:   Scribe #1: I performed the above scribed service and the documentation accurately describes the services I performed. I attest to the accuracy of the note.                          Clinical Impression:       ICD-10-CM ICD-9-CM   1. 6 weeks gestation of pregnancy Z3A.01 V22.2   2. Threatened miscarriage O20.0 640.00   3. Vaginal bleeding in pregnancy, first trimester O20.9 640.93         Disposition:   Disposition: Discharged  Condition:  Stable     Scribe attestation: I, Jaciel Talbert NP, personally performed the services described in this documentation. All medical record entries made by the scribe were at my direction and in my presence.  I have reviewed the chart and agree that the record reflects my personal performance and is accurate and complete.'                 Jaciel Talbert NP  01/31/20 0404

## 2020-01-31 NOTE — ED TRIAGE NOTES
25 y.o. Female presents to the ED with chief complaint of vaginal bleeding. Pt reports light vaginal bleeding that started at 1630, states she is pregnant and unaware of due date. Pt states LMP was 12/12/2019. Pt denies abdominal pain, N/V/D, CP, SOB, fever and dysuria. AAOx4, NAD.

## 2020-02-01 LAB — BACTERIA UR CULT: NORMAL

## 2021-04-16 ENCOUNTER — PATIENT MESSAGE (OUTPATIENT)
Dept: RESEARCH | Facility: HOSPITAL | Age: 26
End: 2021-04-16

## 2022-06-16 ENCOUNTER — HOSPITAL ENCOUNTER (EMERGENCY)
Facility: HOSPITAL | Age: 27
Discharge: HOME | End: 2022-06-16
Attending: STUDENT IN AN ORGANIZED HEALTH CARE EDUCATION/TRAINING PROGRAM
Payer: MEDICAID

## 2022-06-16 VITALS
WEIGHT: 213 LBS | BODY MASS INDEX: 39.2 KG/M2 | HEIGHT: 62 IN | DIASTOLIC BLOOD PRESSURE: 57 MMHG | OXYGEN SATURATION: 99 % | HEART RATE: 100 BPM | RESPIRATION RATE: 18 BRPM | SYSTOLIC BLOOD PRESSURE: 116 MMHG | TEMPERATURE: 98 F

## 2022-06-16 DIAGNOSIS — J02.0 STREPTOCOCCAL PHARYNGITIS: Primary | ICD-10-CM

## 2022-06-16 DIAGNOSIS — R00.0 TACHYCARDIA: ICD-10-CM

## 2022-06-16 LAB
ALBUMIN SERPL BCP-MCNC: 3.7 G/DL (ref 3.5–5.2)
ALP SERPL-CCNC: 81 U/L (ref 55–135)
ALT SERPL W/O P-5'-P-CCNC: 63 U/L (ref 10–44)
ANION GAP SERPL CALC-SCNC: 12 MMOL/L (ref 8–16)
AST SERPL-CCNC: 80 U/L (ref 10–40)
B-HCG UR QL: NEGATIVE
BACTERIA #/AREA URNS HPF: NORMAL /HPF
BASOPHILS # BLD AUTO: 0.02 K/UL (ref 0–0.2)
BASOPHILS NFR BLD: 0.2 % (ref 0–1.9)
BILIRUB SERPL-MCNC: 0.5 MG/DL (ref 0.1–1)
BILIRUB UR QL STRIP: ABNORMAL
BUN SERPL-MCNC: 5 MG/DL (ref 6–20)
CALCIUM SERPL-MCNC: 9.4 MG/DL (ref 8.7–10.5)
CHLORIDE SERPL-SCNC: 106 MMOL/L (ref 95–110)
CLARITY UR: ABNORMAL
CO2 SERPL-SCNC: 21 MMOL/L (ref 23–29)
COLOR UR: YELLOW
CREAT SERPL-MCNC: 0.9 MG/DL (ref 0.5–1.4)
CTP QC/QA: YES
DIFFERENTIAL METHOD: ABNORMAL
EOSINOPHIL # BLD AUTO: 0.1 K/UL (ref 0–0.5)
EOSINOPHIL NFR BLD: 0.8 % (ref 0–8)
ERYTHROCYTE [DISTWIDTH] IN BLOOD BY AUTOMATED COUNT: 17.5 % (ref 11.5–14.5)
EST. GFR  (AFRICAN AMERICAN): >60 ML/MIN/1.73 M^2
EST. GFR  (NON AFRICAN AMERICAN): >60 ML/MIN/1.73 M^2
GLUCOSE SERPL-MCNC: 97 MG/DL (ref 70–110)
GLUCOSE UR QL STRIP: NEGATIVE
HCT VFR BLD AUTO: 35.7 % (ref 37–48.5)
HGB BLD-MCNC: 10.6 G/DL (ref 12–16)
HGB UR QL STRIP: ABNORMAL
HYALINE CASTS #/AREA URNS LPF: 0 /LPF
IMM GRANULOCYTES # BLD AUTO: 0.07 K/UL (ref 0–0.04)
IMM GRANULOCYTES NFR BLD AUTO: 0.5 % (ref 0–0.5)
KETONES UR QL STRIP: ABNORMAL
LACTATE SERPL-SCNC: 0.8 MMOL/L (ref 0.5–2.2)
LEUKOCYTE ESTERASE UR QL STRIP: NEGATIVE
LYMPHOCYTES # BLD AUTO: 1.4 K/UL (ref 1–4.8)
LYMPHOCYTES NFR BLD: 11.2 % (ref 18–48)
MCH RBC QN AUTO: 21.7 PG (ref 27–31)
MCHC RBC AUTO-ENTMCNC: 29.7 G/DL (ref 32–36)
MCV RBC AUTO: 73 FL (ref 82–98)
MICROSCOPIC COMMENT: NORMAL
MOLECULAR STREP A: POSITIVE
MONOCYTES # BLD AUTO: 0.9 K/UL (ref 0.3–1)
MONOCYTES NFR BLD: 7.1 % (ref 4–15)
NEUTROPHILS # BLD AUTO: 10.3 K/UL (ref 1.8–7.7)
NEUTROPHILS NFR BLD: 80.2 % (ref 38–73)
NITRITE UR QL STRIP: NEGATIVE
NRBC BLD-RTO: 0 /100 WBC
PH UR STRIP: 6 [PH] (ref 5–8)
PLATELET # BLD AUTO: 290 K/UL (ref 150–450)
PMV BLD AUTO: 11.2 FL (ref 9.2–12.9)
POC MOLECULAR INFLUENZA A AGN: NEGATIVE
POC MOLECULAR INFLUENZA B AGN: NEGATIVE
POTASSIUM SERPL-SCNC: 3.7 MMOL/L (ref 3.5–5.1)
PROT SERPL-MCNC: 8.3 G/DL (ref 6–8.4)
PROT UR QL STRIP: ABNORMAL
RBC # BLD AUTO: 4.88 M/UL (ref 4–5.4)
RBC #/AREA URNS HPF: 4 /HPF (ref 0–4)
SARS-COV-2 RDRP RESP QL NAA+PROBE: NEGATIVE
SODIUM SERPL-SCNC: 139 MMOL/L (ref 136–145)
SP GR UR STRIP: >1.03 (ref 1–1.03)
SQUAMOUS #/AREA URNS HPF: 12 /HPF
URN SPEC COLLECT METH UR: ABNORMAL
UROBILINOGEN UR STRIP-ACNC: ABNORMAL EU/DL
WBC # BLD AUTO: 12.89 K/UL (ref 3.9–12.7)
WBC #/AREA URNS HPF: 2 /HPF (ref 0–5)

## 2022-06-16 PROCEDURE — 87502 INFLUENZA DNA AMP PROBE: CPT

## 2022-06-16 PROCEDURE — 96375 TX/PRO/DX INJ NEW DRUG ADDON: CPT

## 2022-06-16 PROCEDURE — 63600175 PHARM REV CODE 636 W HCPCS: Performed by: EMERGENCY MEDICINE

## 2022-06-16 PROCEDURE — 99291 CRITICAL CARE FIRST HOUR: CPT | Mod: 25

## 2022-06-16 PROCEDURE — 87040 BLOOD CULTURE FOR BACTERIA: CPT | Performed by: EMERGENCY MEDICINE

## 2022-06-16 PROCEDURE — 96361 HYDRATE IV INFUSION ADD-ON: CPT

## 2022-06-16 PROCEDURE — U0002 COVID-19 LAB TEST NON-CDC: HCPCS | Performed by: EMERGENCY MEDICINE

## 2022-06-16 PROCEDURE — 96365 THER/PROPH/DIAG IV INF INIT: CPT

## 2022-06-16 PROCEDURE — 25000003 PHARM REV CODE 250: Performed by: EMERGENCY MEDICINE

## 2022-06-16 PROCEDURE — 87186 SC STD MICRODIL/AGAR DIL: CPT | Performed by: EMERGENCY MEDICINE

## 2022-06-16 PROCEDURE — 81000 URINALYSIS NONAUTO W/SCOPE: CPT | Performed by: EMERGENCY MEDICINE

## 2022-06-16 PROCEDURE — 87147 CULTURE TYPE IMMUNOLOGIC: CPT | Performed by: EMERGENCY MEDICINE

## 2022-06-16 PROCEDURE — 93010 EKG 12-LEAD: ICD-10-PCS | Mod: ,,, | Performed by: INTERNAL MEDICINE

## 2022-06-16 PROCEDURE — 93010 ELECTROCARDIOGRAM REPORT: CPT | Mod: ,,, | Performed by: INTERNAL MEDICINE

## 2022-06-16 PROCEDURE — 83605 ASSAY OF LACTIC ACID: CPT | Performed by: EMERGENCY MEDICINE

## 2022-06-16 PROCEDURE — 93005 ELECTROCARDIOGRAM TRACING: CPT

## 2022-06-16 PROCEDURE — 81025 URINE PREGNANCY TEST: CPT | Performed by: EMERGENCY MEDICINE

## 2022-06-16 PROCEDURE — 80053 COMPREHEN METABOLIC PANEL: CPT | Performed by: EMERGENCY MEDICINE

## 2022-06-16 PROCEDURE — 63600175 PHARM REV CODE 636 W HCPCS: Performed by: STUDENT IN AN ORGANIZED HEALTH CARE EDUCATION/TRAINING PROGRAM

## 2022-06-16 PROCEDURE — 85025 COMPLETE CBC W/AUTO DIFF WBC: CPT | Performed by: EMERGENCY MEDICINE

## 2022-06-16 RX ORDER — AMOXICILLIN 875 MG/1
875 TABLET, FILM COATED ORAL 2 TIMES DAILY
Qty: 20 TABLET | Refills: 0 | Status: SHIPPED | OUTPATIENT
Start: 2022-06-16 | End: 2022-06-26

## 2022-06-16 RX ORDER — DEXAMETHASONE SODIUM PHOSPHATE 4 MG/ML
8 INJECTION, SOLUTION INTRA-ARTICULAR; INTRALESIONAL; INTRAMUSCULAR; INTRAVENOUS; SOFT TISSUE
Status: COMPLETED | OUTPATIENT
Start: 2022-06-16 | End: 2022-06-16

## 2022-06-16 RX ORDER — ACETAMINOPHEN 500 MG
1000 TABLET ORAL
Status: COMPLETED | OUTPATIENT
Start: 2022-06-16 | End: 2022-06-16

## 2022-06-16 RX ADMIN — SODIUM CHLORIDE, SODIUM LACTATE, POTASSIUM CHLORIDE, AND CALCIUM CHLORIDE 2898 ML: .6; .31; .03; .02 INJECTION, SOLUTION INTRAVENOUS at 08:06

## 2022-06-16 RX ADMIN — ACETAMINOPHEN 1000 MG: 500 TABLET ORAL at 08:06

## 2022-06-16 RX ADMIN — CEFTRIAXONE 1 G: 1 INJECTION, SOLUTION INTRAVENOUS at 10:06

## 2022-06-16 RX ADMIN — DEXAMETHASONE SODIUM PHOSPHATE 8 MG: 4 INJECTION INTRA-ARTICULAR; INTRALESIONAL; INTRAMUSCULAR; INTRAVENOUS; SOFT TISSUE at 10:06

## 2022-06-17 NOTE — ED TRIAGE NOTES
Lizy Scott, a 27 y.o. female presents to the ED w/ complaint of URI symptoms x 1 weeks with fever.     Triage note:  Chief Complaint   Patient presents with    COVID-19 Concerns     Pt states that she started with cough, congestion and fever on Tuesday last week that has progressively gotten worse.     Review of patient's allergies indicates:  No Known Allergies  Past Medical History:   Diagnosis Date    Alpha thalassemia trait

## 2022-06-17 NOTE — ED PROVIDER NOTES
Encounter Date: 2022    SCRIBE #1 NOTE: I, Gurinder Martinez, am scribing for, and in the presence of, Linda Lee DO.       History     Chief Complaint   Patient presents with    COVID-19 Concerns     Pt states that she started with cough, congestion and fever on Tuesday last week that has progressively gotten worse.     Lizy Scott is a 27 y.o. female with no pertinent past medical history who presents to the Emergency Department for evaluation of a 2 day history of worsening sore throat with associated 1 day history of fever, intermittent headache, neck pain, congestion, cough, and shortness of breath. Patient attributes her shortness of breath to her nasal congestion. She is also complaining of increased urinary frequency that began last night. Patient explains she had contact with 2 coworkers who recently tested positive for COVID. She reports she is not vaccinated for COVID. Patient attempted treatment of her symptoms with robitussin and 1 pill of amoxicillin from an old prescription. She states she took an at home COVID test that came back negative. Patient notes she has no known drug allergies. She denies diarrhea, constipation, abdominal pain, or other associated symptoms.      The history is provided by the patient.     Review of patient's allergies indicates:  No Known Allergies  Past Medical History:   Diagnosis Date    Alpha thalassemia trait      Past Surgical History:   Procedure Laterality Date     SECTION N/A 2018    Procedure:  SECTION;  Surgeon: Lorna Cates MD;  Location: [facility] L&D OR;  Service: OB/GYN;  Laterality: N/A;     Family History   Problem Relation Age of Onset    Hypertension Mother      Social History     Tobacco Use    Smoking status: Never Smoker    Smokeless tobacco: Never Used   Substance Use Topics    Alcohol use: No    Drug use: Never     Review of Systems   Constitutional: Positive for fever. Negative for chills.   HENT:  Positive for congestion and sore throat. Negative for drooling, facial swelling and trouble swallowing.    Eyes: Negative for redness and visual disturbance.   Respiratory: Positive for cough and shortness of breath. Negative for stridor.    Cardiovascular: Negative for chest pain.   Gastrointestinal: Negative for abdominal pain, diarrhea, nausea and vomiting.   Genitourinary: Positive for frequency. Negative for dysuria and hematuria.   Musculoskeletal: Positive for neck pain. Negative for gait problem and neck stiffness.   Skin: Negative for pallor and wound.   Neurological: Positive for headaches. Negative for syncope, facial asymmetry, speech difficulty and weakness.   Psychiatric/Behavioral: Negative for confusion.   All other systems reviewed and are negative.      Physical Exam     Initial Vitals [06/16/22 2029]   BP Pulse Resp Temp SpO2   132/76 (!) 128 18 (!) 101.2 °F (38.4 °C) 98 %      MAP       --         Physical Exam    Nursing note and vitals reviewed.  Constitutional: Vital signs are normal. She appears well-developed and well-nourished. She is not diaphoretic. She is Obese .  Non-toxic appearance. She does not have a sickly appearance. She appears ill.   HENT:   Head: Normocephalic and atraumatic.   Nose: Nose normal.   Mouth/Throat: Uvula is midline and mucous membranes are normal. No trismus in the jaw. Uvula swelling present. Oropharyngeal exudate, posterior oropharyngeal edema and posterior oropharyngeal erythema present. No tonsillar abscesses.   Eyes: Conjunctivae and EOM are normal. Pupils are equal, round, and reactive to light.   Neck: Neck supple.   Normal range of motion.  Cardiovascular: Regular rhythm, normal heart sounds and intact distal pulses. Tachycardia present.    Pulmonary/Chest: No respiratory distress. She has decreased breath sounds (Diffusely). She has no wheezes. She has no rhonchi. She has no rales.   Abdominal: Abdomen is soft. She exhibits no distension. There is no  abdominal tenderness. There is no rebound and no guarding.   Musculoskeletal:         General: No tenderness or edema. Normal range of motion.      Cervical back: Normal range of motion and neck supple. No rigidity. No spinous process tenderness.     Neurological: She is alert. She has normal strength. She displays no atrophy and no tremor. She exhibits normal muscle tone. She displays no seizure activity. Gait normal.   Moves all extremities, follows all commands, no focal neurological deficit   Skin: Skin is warm and dry. No rash noted. No erythema.   Psychiatric: She has a normal mood and affect.         ED Course   Critical Care    Date/Time: 6/16/2022 11:44 PM  Performed by: Linda Lee DO  Authorized by: Linda Lee DO   Direct patient critical care time: 35 minutes  Additional history critical care time: 10 minutes  Ordering / reviewing critical care time: 10 minutes  Documentation critical care time: 10 minutes  Total critical care time (exclusive of procedural time) : 65 minutes  Critical care time was exclusive of separately billable procedures and treating other patients and teaching time.  Critical care was necessary to treat or prevent imminent or life-threatening deterioration of the following conditions: sepsis.  Critical care was time spent personally by me on the following activities: evaluation of patient's response to treatment, obtaining history from patient or surrogate, ordering and review of laboratory studies, development of treatment plan with patient or surrogate, examination of patient, ordering and performing treatments and interventions, ordering and review of radiographic studies and re-evaluation of patient's condition.        Labs Reviewed   CULTURE, BLOOD - Abnormal; Notable for the following components:       Result Value    Blood Culture, Routine   (*)     Value: STREPTOCOCCUS AGALACTIAE (GROUP B)  Beta-hemolytic streptococci are routinely susceptible to    penicillins,cephalosporins and carbapenems.      Blood Culture, Routine   (*)     Value: COAGULASE-NEGATIVE STAPHYLOCOCCUS SPECIES  Organism is a probable contaminant      All other components within normal limits    Narrative:     Aerobic and anaerobic   CBC W/ AUTO DIFFERENTIAL - Abnormal; Notable for the following components:    WBC 12.89 (*)     Hemoglobin 10.6 (*)     Hematocrit 35.7 (*)     MCV 73 (*)     MCH 21.7 (*)     MCHC 29.7 (*)     RDW 17.5 (*)     Gran # (ANC) 10.3 (*)     Immature Grans (Abs) 0.07 (*)     Gran % 80.2 (*)     Lymph % 11.2 (*)     All other components within normal limits   COMPREHENSIVE METABOLIC PANEL - Abnormal; Notable for the following components:    CO2 21 (*)     BUN 5 (*)     AST 80 (*)     ALT 63 (*)     All other components within normal limits   URINALYSIS, REFLEX TO URINE CULTURE - Abnormal; Notable for the following components:    Appearance, UA Hazy (*)     Specific Gravity, UA >1.030 (*)     Protein, UA 2+ (*)     Ketones, UA Trace (*)     Bilirubin (UA) 1+ (*)     Occult Blood UA 1+ (*)     Urobilinogen, UA 4.0-6.0 (*)     All other components within normal limits    Narrative:     Specimen Source->Urine   POCT STREP A MOLECULAR - Abnormal; Notable for the following components:    Molecular Strep A, POC Positive (*)     All other components within normal limits   CULTURE, BLOOD    Narrative:     Aerobic and anaerobic   LACTIC ACID, PLASMA   URINALYSIS MICROSCOPIC    Narrative:     Specimen Source->Urine   SARS-COV-2 RDRP GENE   POCT INFLUENZA A/B MOLECULAR   POCT URINE PREGNANCY     EKG Readings: (Independently Interpreted)   Sinus tachycardia with a rate of 124 beats per minute, normal axis and intervals, nonspecific T-wave inversions in leads V1 through V3.  No obvious acute ST segment changes.  No old EKG in epic to compare.     ECG Results          EKG 12-lead (Final result)  Result time 06/17/22 16:50:01    Final result by Interface, Lab In [facility] (06/17/22  16:50:01)                 Narrative:    Test Reason : R00.0,    Vent. Rate : 124 BPM     Atrial Rate : 124 BPM     P-R Int : 136 ms          QRS Dur : 090 ms      QT Int : 300 ms       P-R-T Axes : 038 072 050 degrees     QTc Int : 431 ms    Sinus tachycardia  Nonspecific T wave abnormality  Abnormal ECG  No previous ECGs available  Confirmed by Marv Bagley MD ([id number]) on 6/17/2022 4:49:55 PM    Referred By: [email or web address]   SELF           Confirmed By:Marv Bagley MD                            Imaging Results          X-Ray Chest AP Portable (Final result)  Result time 06/16/22 22:09:14    Final result by Oseas Segura MD (06/16/22 22:09:14)                 Impression:      No acute cardiopulmonary process identified.      Electronically signed by: Oseas Segura MD  Date:    06/16/2022  Time:    22:09             Narrative:    EXAMINATION:  XR CHEST AP PORTABLE    CLINICAL HISTORY:  Sepsis;    TECHNIQUE:  Single frontal view of the chest was performed.    COMPARISON:  None    FINDINGS:  Cardiac silhouette is normal in size.  Lungs are hypoinflated.  No evidence of focal consolidative process, pneumothorax, or significant pleural effusion.  No acute osseous abnormality identified.                                 Medications   lactated ringers bolus 2,898 mL (0 mL/kg × 96.6 kg Intravenous Stopped 6/16/22 2332)   acetaminophen tablet 1,000 mg (1,000 mg Oral Given 6/16/22 2053)   dexamethasone injection 8 mg (8 mg Intravenous Given 6/16/22 2239)   cefTRIAXone (ROCEPHIN) 1 g/50 mL D5W IVPB (0 g Intravenous Stopped 6/16/22 2320)     Medical Decision Making:   History:   Old Medical Records: I decided to obtain old medical records.  Clinical Tests:   Lab Tests: Reviewed and Ordered  Radiological Study: Ordered and Reviewed  Medical Tests: Reviewed and Ordered  ED Management:   [facility]  This is an emergent evaluation of a 27 y.o. female with no pertinent past medical history who presents with a 2 day history of worsening  sore throat with associated 1 day history of  shortness of breath. Initial vitals in the ED with an oral temperature of 101.2°, pulse 128 with remainder vitals unremarkable. Physical exam noted above. DDx includes but is not limited to influenza vs COVID vs other viral illness, strep pharyngitis, sepsis, UTI. Also considered but clinically less likely to be meningitis. Labs and imaging including septic workup, point of care strep/COVID/influenza was obtained.  Labs reveal a mild leukocytosis.  Stable microcytic anemia.  Strep A positive. Mild elevation of liver enzymes.  UA with hazy appearing urine with 2+ protein, trace ketones 1+ bilirubin and 1+ occult blood.  Remainder of labs unremarkable.  Cultures pending.  Chest x-ray shows no acute abnormality.  EKG consistent with sinus tachycardia without any acute ischemic changes.  Patient was initially treated with Tylenol, 30 cc/kg IV fluids, IV Decadron as well as a dose of IV Rocephin.  On reassessment, patient's symptoms were improved as well as her vitals.  Patient was offered a 1 time IM injection of antibiotics for her strep a pharyngitis, however patient states she would prefer treatment with oral antibiotics at discharge.  History, presentation and workup consistent with strep a. Will discharge amoxicillin, instructions for symptomatic treatment at home, PCP follow-up and ED return precautions. Patient is aware of plan and is amenable.     Linda Lee D.O  EMERGENCY MEDICINE  11:00 PM 06/16/2022            Scribe Attestation:   Scribe #1: I performed the above scribed service and the documentation accurately describes the services I performed. I attest to the accuracy of the note.                 Clinical Impression:   Final diagnoses:  [R00.0] Tachycardia  [J02.0] Streptococcal pharyngitis (Primary)          ED Disposition Condition    Discharge Stable        ED Prescriptions     Medication Sig Dispense Start Date End Date Auth. Provider     amoxicillin (AMOXIL) 875 MG tablet Take 1 tablet (875 mg total) by mouth 2 (two) times daily. for 10 days 20 tablet 6/16/2022 6/26/2022 Linda Lee DO        Follow-up Information     Follow up With Specialties Details Why Contact Info    Your primary care doctor  Schedule an appointment as soon as possible for a visit on 6/20/2022 Emergency Room Follow-up     [facility] Emergency Dept Emergency Medicine Go to  If symptoms worsen [address]  [facility] [address] [phone]         I, Linda Lee DO, personally performed the services described in this documentation. All medical record entries made by the scribe were at my direction and in my presence. I have reviewed the chart and agree that the record reflects my personal performance and is accurate and complete.     Linda Lee,   06/22/22 3482

## 2022-06-19 LAB
BACTERIA BLD CULT: ABNORMAL

## 2022-06-21 LAB — BACTERIA BLD CULT: NORMAL

## (undated) DEVICE — PAD ABD 8X10 STERILE